# Patient Record
Sex: MALE | Race: WHITE | NOT HISPANIC OR LATINO | Employment: UNEMPLOYED | URBAN - METROPOLITAN AREA
[De-identification: names, ages, dates, MRNs, and addresses within clinical notes are randomized per-mention and may not be internally consistent; named-entity substitution may affect disease eponyms.]

---

## 2021-03-01 ENCOUNTER — OFFICE VISIT (OUTPATIENT)
Dept: PRIMARY CARE CLINIC | Facility: CLINIC | Age: 66
End: 2021-03-01
Payer: MEDICARE

## 2021-03-01 ENCOUNTER — LAB VISIT (OUTPATIENT)
Dept: LAB | Facility: HOSPITAL | Age: 66
End: 2021-03-01
Attending: INTERNAL MEDICINE
Payer: MEDICARE

## 2021-03-01 VITALS
TEMPERATURE: 98 F | HEIGHT: 70 IN | DIASTOLIC BLOOD PRESSURE: 70 MMHG | HEART RATE: 63 BPM | WEIGHT: 201.75 LBS | OXYGEN SATURATION: 96 % | BODY MASS INDEX: 28.88 KG/M2 | SYSTOLIC BLOOD PRESSURE: 100 MMHG

## 2021-03-01 DIAGNOSIS — I25.10 CORONARY ARTERY DISEASE INVOLVING NATIVE CORONARY ARTERY OF NATIVE HEART WITHOUT ANGINA PECTORIS: ICD-10-CM

## 2021-03-01 DIAGNOSIS — I25.10 CORONARY ARTERY DISEASE INVOLVING NATIVE CORONARY ARTERY OF NATIVE HEART WITHOUT ANGINA PECTORIS: Primary | ICD-10-CM

## 2021-03-01 DIAGNOSIS — E78.2 HYPERLIPIDEMIA, MIXED: ICD-10-CM

## 2021-03-01 DIAGNOSIS — Z23 NEED FOR VACCINATION: ICD-10-CM

## 2021-03-01 DIAGNOSIS — Z12.11 SCREEN FOR COLON CANCER: ICD-10-CM

## 2021-03-01 PROCEDURE — 99203 OFFICE O/P NEW LOW 30 MIN: CPT | Mod: PBBFAC,PN | Performed by: INTERNAL MEDICINE

## 2021-03-01 PROCEDURE — 80061 LIPID PANEL: CPT

## 2021-03-01 PROCEDURE — 99204 OFFICE O/P NEW MOD 45 MIN: CPT | Mod: S$GLB,,, | Performed by: INTERNAL MEDICINE

## 2021-03-01 PROCEDURE — 99999 PR PBB SHADOW E&M-NEW PATIENT-LVL III: CPT | Mod: PBBFAC,,, | Performed by: INTERNAL MEDICINE

## 2021-03-01 PROCEDURE — 99204 PR OFFICE/OUTPT VISIT, NEW, LEVL IV, 45-59 MIN: ICD-10-PCS | Mod: S$GLB,,, | Performed by: INTERNAL MEDICINE

## 2021-03-01 PROCEDURE — 36415 COLL VENOUS BLD VENIPUNCTURE: CPT | Mod: PN

## 2021-03-01 PROCEDURE — 80053 COMPREHEN METABOLIC PANEL: CPT

## 2021-03-01 PROCEDURE — 99999 PR PBB SHADOW E&M-NEW PATIENT-LVL III: ICD-10-PCS | Mod: PBBFAC,,, | Performed by: INTERNAL MEDICINE

## 2021-03-01 RX ORDER — ASPIRIN 81 MG/1
81 TABLET ORAL
COMMUNITY
Start: 2020-06-06

## 2021-03-01 RX ORDER — AA/PROT/LYSINE/METHIO/VIT C/B6 50-12.5 MG
10 TABLET ORAL DAILY
COMMUNITY

## 2021-03-01 RX ORDER — METOPROLOL SUCCINATE 25 MG/1
25 TABLET, EXTENDED RELEASE ORAL DAILY
COMMUNITY
Start: 2020-08-03

## 2021-03-01 RX ORDER — ATORVASTATIN CALCIUM 80 MG/1
80 TABLET, FILM COATED ORAL DAILY
Qty: 90 TABLET | Refills: 3 | Status: SHIPPED | OUTPATIENT
Start: 2021-03-01 | End: 2021-06-18 | Stop reason: SDUPTHER

## 2021-03-01 RX ORDER — VIT C/E/ZN/COPPR/LUTEIN/ZEAXAN 250MG-90MG
1000 CAPSULE ORAL
COMMUNITY

## 2021-03-01 RX ORDER — NITROGLYCERIN 0.4 MG/1
0.4 TABLET SUBLINGUAL DAILY PRN
COMMUNITY
Start: 2020-06-08 | End: 2021-06-11 | Stop reason: SDUPTHER

## 2021-03-01 RX ORDER — ATORVASTATIN CALCIUM 80 MG/1
80 TABLET, FILM COATED ORAL
COMMUNITY
Start: 2020-06-08 | End: 2021-03-01 | Stop reason: SDUPTHER

## 2021-03-02 LAB
ALBUMIN SERPL BCP-MCNC: 4.2 G/DL (ref 3.5–5.2)
ALP SERPL-CCNC: 71 U/L (ref 55–135)
ALT SERPL W/O P-5'-P-CCNC: 33 U/L (ref 10–44)
ANION GAP SERPL CALC-SCNC: 9 MMOL/L (ref 8–16)
AST SERPL-CCNC: 26 U/L (ref 10–40)
BILIRUB SERPL-MCNC: 0.8 MG/DL (ref 0.1–1)
BUN SERPL-MCNC: 19 MG/DL (ref 8–23)
CALCIUM SERPL-MCNC: 9.1 MG/DL (ref 8.7–10.5)
CHLORIDE SERPL-SCNC: 106 MMOL/L (ref 95–110)
CHOLEST SERPL-MCNC: 139 MG/DL (ref 120–199)
CHOLEST/HDLC SERPL: 3.2 {RATIO} (ref 2–5)
CO2 SERPL-SCNC: 25 MMOL/L (ref 23–29)
CREAT SERPL-MCNC: 1 MG/DL (ref 0.5–1.4)
EST. GFR  (AFRICAN AMERICAN): >60 ML/MIN/1.73 M^2
EST. GFR  (NON AFRICAN AMERICAN): >60 ML/MIN/1.73 M^2
GLUCOSE SERPL-MCNC: 75 MG/DL (ref 70–110)
HDLC SERPL-MCNC: 44 MG/DL (ref 40–75)
HDLC SERPL: 31.7 % (ref 20–50)
LDLC SERPL CALC-MCNC: 81.6 MG/DL (ref 63–159)
NONHDLC SERPL-MCNC: 95 MG/DL
POTASSIUM SERPL-SCNC: 4.5 MMOL/L (ref 3.5–5.1)
PROT SERPL-MCNC: 7.2 G/DL (ref 6–8.4)
SODIUM SERPL-SCNC: 140 MMOL/L (ref 136–145)
TRIGL SERPL-MCNC: 67 MG/DL (ref 30–150)

## 2021-03-09 ENCOUNTER — TELEPHONE (OUTPATIENT)
Dept: FAMILY MEDICINE | Facility: CLINIC | Age: 66
End: 2021-03-09

## 2021-03-12 ENCOUNTER — PATIENT MESSAGE (OUTPATIENT)
Dept: FAMILY MEDICINE | Facility: CLINIC | Age: 66
End: 2021-03-12

## 2021-06-10 ENCOUNTER — PATIENT MESSAGE (OUTPATIENT)
Dept: PRIMARY CARE CLINIC | Facility: CLINIC | Age: 66
End: 2021-06-10

## 2021-06-10 ENCOUNTER — OFFICE VISIT (OUTPATIENT)
Dept: URGENT CARE | Facility: CLINIC | Age: 66
End: 2021-06-10

## 2021-06-10 VITALS
OXYGEN SATURATION: 98 % | HEIGHT: 69 IN | DIASTOLIC BLOOD PRESSURE: 82 MMHG | BODY MASS INDEX: 29.33 KG/M2 | TEMPERATURE: 98 F | WEIGHT: 198 LBS | SYSTOLIC BLOOD PRESSURE: 115 MMHG | RESPIRATION RATE: 16 BRPM | HEART RATE: 76 BPM

## 2021-06-10 DIAGNOSIS — I25.10 CORONARY ARTERY DISEASE INVOLVING NATIVE CORONARY ARTERY OF NATIVE HEART WITHOUT ANGINA PECTORIS: Primary | ICD-10-CM

## 2021-06-11 ENCOUNTER — OFFICE VISIT (OUTPATIENT)
Dept: CARDIOLOGY | Facility: CLINIC | Age: 66
End: 2021-06-11
Payer: MEDICARE

## 2021-06-11 VITALS
HEIGHT: 70 IN | HEART RATE: 64 BPM | SYSTOLIC BLOOD PRESSURE: 114 MMHG | BODY MASS INDEX: 28.56 KG/M2 | WEIGHT: 199.5 LBS | DIASTOLIC BLOOD PRESSURE: 62 MMHG

## 2021-06-11 DIAGNOSIS — I25.10 CORONARY ARTERY DISEASE INVOLVING NATIVE CORONARY ARTERY OF NATIVE HEART WITHOUT ANGINA PECTORIS: Primary | ICD-10-CM

## 2021-06-11 DIAGNOSIS — E78.00 HYPERCHOLESTEREMIA: ICD-10-CM

## 2021-06-11 DIAGNOSIS — I49.3 PVCS (PREMATURE VENTRICULAR CONTRACTIONS): ICD-10-CM

## 2021-06-11 DIAGNOSIS — I25.2 OLD MI (MYOCARDIAL INFARCTION): ICD-10-CM

## 2021-06-11 DIAGNOSIS — Z95.5 S/P CORONARY ARTERY STENT PLACEMENT: ICD-10-CM

## 2021-06-11 PROCEDURE — 93010 ELECTROCARDIOGRAM REPORT: CPT | Mod: S$GLB,,, | Performed by: INTERNAL MEDICINE

## 2021-06-11 PROCEDURE — 99204 PR OFFICE/OUTPT VISIT, NEW, LEVL IV, 45-59 MIN: ICD-10-PCS | Mod: S$GLB,,, | Performed by: INTERNAL MEDICINE

## 2021-06-11 PROCEDURE — 99499 RISK ADDL DX/OHS AUDIT: ICD-10-PCS | Mod: S$GLB,,, | Performed by: INTERNAL MEDICINE

## 2021-06-11 PROCEDURE — 99499 UNLISTED E&M SERVICE: CPT | Mod: S$GLB,,, | Performed by: INTERNAL MEDICINE

## 2021-06-11 PROCEDURE — 99204 OFFICE O/P NEW MOD 45 MIN: CPT | Mod: S$GLB,,, | Performed by: INTERNAL MEDICINE

## 2021-06-11 PROCEDURE — 99999 PR PBB SHADOW E&M-EST. PATIENT-LVL IV: ICD-10-PCS | Mod: PBBFAC,,, | Performed by: INTERNAL MEDICINE

## 2021-06-11 PROCEDURE — 3008F BODY MASS INDEX DOCD: CPT | Mod: CPTII,S$GLB,, | Performed by: INTERNAL MEDICINE

## 2021-06-11 PROCEDURE — 93005 ELECTROCARDIOGRAM TRACING: CPT | Mod: S$GLB,,, | Performed by: INTERNAL MEDICINE

## 2021-06-11 PROCEDURE — 1126F AMNT PAIN NOTED NONE PRSNT: CPT | Mod: S$GLB,,, | Performed by: INTERNAL MEDICINE

## 2021-06-11 PROCEDURE — 3288F PR FALLS RISK ASSESSMENT DOCUMENTED: ICD-10-PCS | Mod: CPTII,S$GLB,, | Performed by: INTERNAL MEDICINE

## 2021-06-11 PROCEDURE — 1126F PR PAIN SEVERITY QUANTIFIED, NO PAIN PRESENT: ICD-10-PCS | Mod: S$GLB,,, | Performed by: INTERNAL MEDICINE

## 2021-06-11 PROCEDURE — 1101F PR PT FALLS ASSESS DOC 0-1 FALLS W/OUT INJ PAST YR: ICD-10-PCS | Mod: CPTII,S$GLB,, | Performed by: INTERNAL MEDICINE

## 2021-06-11 PROCEDURE — 93010 EKG 12-LEAD: ICD-10-PCS | Mod: S$GLB,,, | Performed by: INTERNAL MEDICINE

## 2021-06-11 PROCEDURE — 99999 PR PBB SHADOW E&M-EST. PATIENT-LVL IV: CPT | Mod: PBBFAC,,, | Performed by: INTERNAL MEDICINE

## 2021-06-11 PROCEDURE — 3008F PR BODY MASS INDEX (BMI) DOCUMENTED: ICD-10-PCS | Mod: CPTII,S$GLB,, | Performed by: INTERNAL MEDICINE

## 2021-06-11 PROCEDURE — 93005 EKG 12-LEAD: ICD-10-PCS | Mod: S$GLB,,, | Performed by: INTERNAL MEDICINE

## 2021-06-11 PROCEDURE — 1101F PT FALLS ASSESS-DOCD LE1/YR: CPT | Mod: CPTII,S$GLB,, | Performed by: INTERNAL MEDICINE

## 2021-06-11 PROCEDURE — 3288F FALL RISK ASSESSMENT DOCD: CPT | Mod: CPTII,S$GLB,, | Performed by: INTERNAL MEDICINE

## 2021-06-11 RX ORDER — NITROGLYCERIN 0.4 MG/1
0.4 TABLET SUBLINGUAL DAILY PRN
Qty: 100 TABLET | Refills: 3 | Status: SHIPPED | OUTPATIENT
Start: 2021-06-11 | End: 2022-06-14

## 2021-06-11 RX ORDER — EZETIMIBE 10 MG/1
10 TABLET ORAL DAILY
Qty: 90 TABLET | Refills: 3 | Status: SHIPPED | OUTPATIENT
Start: 2021-06-11 | End: 2021-07-11

## 2021-06-15 ENCOUNTER — HOSPITAL ENCOUNTER (OUTPATIENT)
Dept: CARDIOLOGY | Facility: HOSPITAL | Age: 66
Discharge: HOME | End: 2021-06-15
Attending: INTERNAL MEDICINE
Payer: MEDICARE

## 2021-06-15 VITALS — WEIGHT: 198 LBS | BODY MASS INDEX: 28.35 KG/M2 | HEIGHT: 70 IN

## 2021-06-15 DIAGNOSIS — I25.2 OLD MI (MYOCARDIAL INFARCTION): ICD-10-CM

## 2021-06-15 DIAGNOSIS — Z95.5 S/P CORONARY ARTERY STENT PLACEMENT: ICD-10-CM

## 2021-06-15 DIAGNOSIS — I25.10 CORONARY ARTERY DISEASE INVOLVING NATIVE CORONARY ARTERY OF NATIVE HEART WITHOUT ANGINA PECTORIS: ICD-10-CM

## 2021-06-15 LAB
ASCENDING AORTA: 3.45 CM
BSA FOR ECHO PROCEDURE: 2.11 M2
CV ECHO LV RWT: 0.34 CM
CV STRESS BASE HR: 67 BPM
DIASTOLIC BLOOD PRESSURE: 98 MMHG
DOP CALC LVOT AREA: 3.6 CM2
DOP CALC LVOT DIAMETER: 2.15 CM
DOP CALC LVOT PEAK VEL: 0.94 M/S
DOP CALC LVOT STROKE VOLUME: 70.43 CM3
DOP CALCLVOT PEAK VEL VTI: 19.41 CM
E WAVE DECELERATION TIME: 276.37 MSEC
E/A RATIO: 0.88
E/E' RATIO: 11 M/S
ECHO LV POSTERIOR WALL: 0.83 CM (ref 0.6–1.1)
EJECTION FRACTION: 55 %
FRACTIONAL SHORTENING: 29 % (ref 28–44)
INTERVENTRICULAR SEPTUM: 0.8 CM (ref 0.6–1.1)
IVRT: 97.05 MSEC
LA MAJOR: 5.29 CM
LA MINOR: 4.88 CM
LA WIDTH: 2.37 CM
LEFT ATRIUM SIZE: 3.02 CM
LEFT ATRIUM VOLUME INDEX: 14.8 ML/M2
LEFT ATRIUM VOLUME: 30.89 CM3
LEFT INTERNAL DIMENSION IN SYSTOLE: 3.46 CM (ref 2.1–4)
LEFT VENTRICLE DIASTOLIC VOLUME INDEX: 54.13 ML/M2
LEFT VENTRICLE DIASTOLIC VOLUME: 112.6 ML
LEFT VENTRICLE MASS INDEX: 65 G/M2
LEFT VENTRICLE SYSTOLIC VOLUME INDEX: 23.8 ML/M2
LEFT VENTRICLE SYSTOLIC VOLUME: 49.49 ML
LEFT VENTRICULAR INTERNAL DIMENSION IN DIASTOLE: 4.9 CM (ref 3.5–6)
LEFT VENTRICULAR MASS: 134.38 G
LV LATERAL E/E' RATIO: 9.63 M/S
LV SEPTAL E/E' RATIO: 12.83 M/S
MV A" WAVE DURATION": 9.71 MSEC
MV PEAK A VEL: 0.88 M/S
MV PEAK E VEL: 0.77 M/S
MV STENOSIS PRESSURE HALF TIME: 80.15 MS
MV VALVE AREA P 1/2 METHOD: 2.74 CM2
OHS CV CPX 1 MINUTE RECOVERY HEART RATE: 113 BPM
OHS CV CPX 85 PERCENT MAX PREDICTED HEART RATE MALE: 132
OHS CV CPX ESTIMATED METS: 10
OHS CV CPX MAX PREDICTED HEART RATE: 155
OHS CV CPX PATIENT IS FEMALE: 0
OHS CV CPX PATIENT IS MALE: 1
OHS CV CPX PEAK DIASTOLIC BLOOD PRESSURE: 72 MMHG
OHS CV CPX PEAK HEAR RATE: 144 BPM
OHS CV CPX PEAK RATE PRESSURE PRODUCT: NORMAL
OHS CV CPX PEAK SYSTOLIC BLOOD PRESSURE: 207 MMHG
OHS CV CPX PERCENT MAX PREDICTED HEART RATE ACHIEVED: 93
OHS CV CPX RATE PRESSURE PRODUCT PRESENTING: 9112
PULM VEIN S/D RATIO: 1.25
PV PEAK D VEL: 0.4 M/S
PV PEAK S VEL: 0.5 M/S
RA MAJOR: 4.55 CM
RA PRESSURE: 3 MMHG
RA WIDTH: 2.33 CM
RIGHT VENTRICULAR END-DIASTOLIC DIMENSION: 3.6 CM
RV TISSUE DOPPLER FREE WALL SYSTOLIC VELOCITY 1 (APICAL 4 CHAMBER VIEW): 9.22 CM/S
SINUS: 3.86 CM
STJ: 3.34 CM
STRESS ECHO POST EXERCISE DUR MIN: 7 MINUTES
STRESS ECHO POST EXERCISE DUR SEC: 16 SECONDS
SYSTOLIC BLOOD PRESSURE: 136 MMHG
TDI LATERAL: 0.08 M/S
TDI SEPTAL: 0.06 M/S
TDI: 0.07 M/S
TRICUSPID ANNULAR PLANE SYSTOLIC EXCURSION: 2.29 CM

## 2021-06-15 PROCEDURE — 93351 STRESS TTE COMPLETE: CPT

## 2021-06-15 PROCEDURE — 93351 STRESS ECHO (CUPID ONLY): ICD-10-PCS | Mod: 26,,, | Performed by: INTERNAL MEDICINE

## 2021-06-15 PROCEDURE — 93351 STRESS TTE COMPLETE: CPT | Mod: 26,,, | Performed by: INTERNAL MEDICINE

## 2021-06-17 ENCOUNTER — CLINICAL SUPPORT (OUTPATIENT)
Dept: URGENT CARE | Facility: CLINIC | Age: 66
End: 2021-06-17

## 2021-06-17 DIAGNOSIS — Z02.89 ENCOUNTER FOR EXAMINATION REQUIRED BY DEPARTMENT OF TRANSPORTATION (DOT): Primary | ICD-10-CM

## 2021-06-17 PROCEDURE — 99499 UNLISTED E&M SERVICE: CPT | Mod: S$GLB,,, | Performed by: NURSE PRACTITIONER

## 2021-06-17 PROCEDURE — 99499 PHYSICAL, RECERT DOT/CDL: ICD-10-PCS | Mod: S$GLB,,, | Performed by: NURSE PRACTITIONER

## 2021-06-18 DIAGNOSIS — E78.2 HYPERLIPIDEMIA, MIXED: ICD-10-CM

## 2021-06-18 DIAGNOSIS — I25.10 CORONARY ARTERY DISEASE INVOLVING NATIVE CORONARY ARTERY OF NATIVE HEART WITHOUT ANGINA PECTORIS: ICD-10-CM

## 2021-06-18 RX ORDER — ATORVASTATIN CALCIUM 80 MG/1
80 TABLET, FILM COATED ORAL DAILY
Qty: 90 TABLET | Refills: 3 | Status: SHIPPED | OUTPATIENT
Start: 2021-06-18

## 2021-06-18 NOTE — TELEPHONE ENCOUNTER
No new care gaps identified.  Powered by [employer]. Reference number: [id number].   6/18/2021 3:02:24 PM CDT

## 2021-08-08 ENCOUNTER — CLINICAL SUPPORT (OUTPATIENT)
Dept: URGENT CARE | Facility: CLINIC | Age: 66
End: 2021-08-08
Payer: MEDICARE

## 2021-08-08 DIAGNOSIS — Z20.822 COVID-19 RULED OUT: Primary | ICD-10-CM

## 2021-08-08 LAB
CTP QC/QA: YES
SARS-COV-2 RDRP RESP QL NAA+PROBE: NEGATIVE

## 2021-12-13 ENCOUNTER — IMMUNIZATION (OUTPATIENT)
Dept: PHARMACY | Facility: CLINIC | Age: 66
End: 2021-12-13
Payer: MEDICARE

## 2021-12-13 DIAGNOSIS — Z23 NEED FOR VACCINATION: Primary | ICD-10-CM

## 2022-02-09 ENCOUNTER — HOSPITAL ENCOUNTER (OUTPATIENT)
Dept: RADIOLOGY | Facility: HOSPITAL | Age: 67
Discharge: HOME | End: 2022-02-09
Attending: INTERNAL MEDICINE
Payer: MEDICARE

## 2022-02-09 ENCOUNTER — OFFICE VISIT (OUTPATIENT)
Dept: INTERNAL MEDICINE | Facility: CLINIC | Age: 67
End: 2022-02-09
Payer: MEDICARE

## 2022-02-09 VITALS
BODY MASS INDEX: 28.2 KG/M2 | SYSTOLIC BLOOD PRESSURE: 120 MMHG | OXYGEN SATURATION: 98 % | WEIGHT: 197 LBS | HEART RATE: 84 BPM | HEIGHT: 70 IN | DIASTOLIC BLOOD PRESSURE: 72 MMHG

## 2022-02-09 DIAGNOSIS — M54.16 ACUTE RIGHT LUMBAR RADICULOPATHY: ICD-10-CM

## 2022-02-09 DIAGNOSIS — M54.16 ACUTE RIGHT LUMBAR RADICULOPATHY: Primary | ICD-10-CM

## 2022-02-09 PROCEDURE — 1159F MED LIST DOCD IN RCRD: CPT | Mod: HCNC,CPTII,S$GLB, | Performed by: INTERNAL MEDICINE

## 2022-02-09 PROCEDURE — 1125F AMNT PAIN NOTED PAIN PRSNT: CPT | Mod: HCNC,CPTII,S$GLB, | Performed by: INTERNAL MEDICINE

## 2022-02-09 PROCEDURE — 3008F PR BODY MASS INDEX (BMI) DOCUMENTED: ICD-10-PCS | Mod: HCNC,CPTII,S$GLB, | Performed by: INTERNAL MEDICINE

## 2022-02-09 PROCEDURE — 1160F PR REVIEW ALL MEDS BY PRESCRIBER/CLIN PHARMACIST DOCUMENTED: ICD-10-PCS | Mod: HCNC,CPTII,S$GLB, | Performed by: INTERNAL MEDICINE

## 2022-02-09 PROCEDURE — 99999 PR PBB SHADOW E&M-EST. PATIENT-LVL III: ICD-10-PCS | Mod: PBBFAC,HCNC,, | Performed by: INTERNAL MEDICINE

## 2022-02-09 PROCEDURE — 3288F FALL RISK ASSESSMENT DOCD: CPT | Mod: HCNC,CPTII,S$GLB, | Performed by: INTERNAL MEDICINE

## 2022-02-09 PROCEDURE — 3288F PR FALLS RISK ASSESSMENT DOCUMENTED: ICD-10-PCS | Mod: HCNC,CPTII,S$GLB, | Performed by: INTERNAL MEDICINE

## 2022-02-09 PROCEDURE — 3078F PR MOST RECENT DIASTOLIC BLOOD PRESSURE < 80 MM HG: ICD-10-PCS | Mod: HCNC,CPTII,S$GLB, | Performed by: INTERNAL MEDICINE

## 2022-02-09 PROCEDURE — 3078F DIAST BP <80 MM HG: CPT | Mod: HCNC,CPTII,S$GLB, | Performed by: INTERNAL MEDICINE

## 2022-02-09 PROCEDURE — 99214 OFFICE O/P EST MOD 30 MIN: CPT | Mod: HCNC,S$GLB,, | Performed by: INTERNAL MEDICINE

## 2022-02-09 PROCEDURE — 1101F PT FALLS ASSESS-DOCD LE1/YR: CPT | Mod: HCNC,CPTII,S$GLB, | Performed by: INTERNAL MEDICINE

## 2022-02-09 PROCEDURE — 72100 X-RAY EXAM L-S SPINE 2/3 VWS: CPT | Mod: TC,HCNC

## 2022-02-09 PROCEDURE — 99214 PR OFFICE/OUTPT VISIT, EST, LEVL IV, 30-39 MIN: ICD-10-PCS | Mod: HCNC,S$GLB,, | Performed by: INTERNAL MEDICINE

## 2022-02-09 PROCEDURE — 3008F BODY MASS INDEX DOCD: CPT | Mod: HCNC,CPTII,S$GLB, | Performed by: INTERNAL MEDICINE

## 2022-02-09 PROCEDURE — 1159F PR MEDICATION LIST DOCUMENTED IN MEDICAL RECORD: ICD-10-PCS | Mod: HCNC,CPTII,S$GLB, | Performed by: INTERNAL MEDICINE

## 2022-02-09 PROCEDURE — 1101F PR PT FALLS ASSESS DOC 0-1 FALLS W/OUT INJ PAST YR: ICD-10-PCS | Mod: HCNC,CPTII,S$GLB, | Performed by: INTERNAL MEDICINE

## 2022-02-09 PROCEDURE — 3074F SYST BP LT 130 MM HG: CPT | Mod: HCNC,CPTII,S$GLB, | Performed by: INTERNAL MEDICINE

## 2022-02-09 PROCEDURE — 3074F PR MOST RECENT SYSTOLIC BLOOD PRESSURE < 130 MM HG: ICD-10-PCS | Mod: HCNC,CPTII,S$GLB, | Performed by: INTERNAL MEDICINE

## 2022-02-09 PROCEDURE — 99999 PR PBB SHADOW E&M-EST. PATIENT-LVL III: CPT | Mod: PBBFAC,HCNC,, | Performed by: INTERNAL MEDICINE

## 2022-02-09 PROCEDURE — 72100 X-RAY EXAM L-S SPINE 2/3 VWS: CPT | Mod: 26,HCNC,, | Performed by: RADIOLOGY

## 2022-02-09 PROCEDURE — 72100 XR LUMBAR SPINE AP AND LATERAL: ICD-10-PCS | Mod: 26,HCNC,, | Performed by: RADIOLOGY

## 2022-02-09 PROCEDURE — 1160F RVW MEDS BY RX/DR IN RCRD: CPT | Mod: HCNC,CPTII,S$GLB, | Performed by: INTERNAL MEDICINE

## 2022-02-09 PROCEDURE — 1125F PR PAIN SEVERITY QUANTIFIED, PAIN PRESENT: ICD-10-PCS | Mod: HCNC,CPTII,S$GLB, | Performed by: INTERNAL MEDICINE

## 2022-02-09 RX ORDER — DICLOFENAC SODIUM 75 MG/1
75 TABLET, DELAYED RELEASE ORAL 2 TIMES DAILY
Qty: 20 TABLET | Refills: 0 | Status: SHIPPED | OUTPATIENT
Start: 2022-02-09 | End: 2022-02-16 | Stop reason: SDUPTHER

## 2022-02-09 RX ORDER — PREDNISONE 20 MG/1
TABLET ORAL
Qty: 12 TABLET | Refills: 0 | Status: SHIPPED | OUTPATIENT
Start: 2022-02-09 | End: 2022-02-16 | Stop reason: SDUPTHER

## 2022-02-09 NOTE — PROGRESS NOTES
66-year-old male  The past 4 days has been having pain it is right buttocks that extends down the lateral buttocks down the lateral aspect of the upper leg to the knee.  It is worse with standing up and walking.  Reports no numbness or tingling involving the leg.  He does not recall any incident activity account for this.  Reports no abdominal pain and no difficulty with bowel urine function  He reports having about of sciatica many years ago that resolved on his own    Medical history  Coronary artery disease, bypass surgery, prior MI  Hyperlipidemia    Medication list per med card    Examination  Weight 197 lb  Pulse 76  Blood pressure 118/72  Not tender over the back  Abdominal exam soft nontender  Positive straight leg raise right of right lower extremity    2+ left knee reflex, absent right knee reflex  1+ left ankle reflex, 2+ right ankle reflex    Impression  Acute right lumbar radiculopathy possibly L4 nerve root    Plan lumbar spine film  Prednisone taper over next 8 days and diclofenac 75 mg b.i.d. for the next 10 days  There is no improvement or worsening, MRI the lumbar spine

## 2022-02-11 ENCOUNTER — NURSE TRIAGE (OUTPATIENT)
Dept: ADMINISTRATIVE | Facility: CLINIC | Age: 67
End: 2022-02-11
Payer: MEDICARE

## 2022-02-12 NOTE — TELEPHONE ENCOUNTER
Pt reports Rt sided lower back pain that is extending down his leg to his knee, was seen by a MD on 2/9/22 and given some prescriptions, but the pain tonight has worsened to severe and he is unable to walk. Pt advised to go to ED now per protocol, Pt encouraged to call back with any worsening symptoms or questions and verbalized understanding.    Reason for Disposition   Weakness of a leg or foot (e.g., unable to bear weight, dragging foot)    Additional Information   Negative: Passed out (i.e., lost consciousness, collapsed and was not responding)   Negative: Shock suspected (e.g., cold/pale/clammy skin, too weak to stand, low BP, rapid pulse)   Negative: Sounds like a life-threatening emergency to the triager   Negative: [1] SEVERE back pain (e.g., excruciating) AND [2] sudden onset AND [3] age > 60 years   Negative: [1] Unable to urinate (or only a few drops) > 4 hours AND [2] bladder feels very full (e.g., palpable bladder or strong urge to urinate)   Negative: [1] Loss of bladder or bowel control (urine or bowel incontinence; wetting self, leaking stool) AND [2] new-onset   Negative: Numbness in groin or rectal area (i.e., loss of sensation)   Negative: [1] SEVERE abdominal pain AND [2] present > 1 hour   Negative: [1] Abdominal pain AND [2] age > 60 years    Protocols used: BACK PAIN-A-

## 2022-02-16 ENCOUNTER — PATIENT MESSAGE (OUTPATIENT)
Dept: INTERNAL MEDICINE | Facility: CLINIC | Age: 67
End: 2022-02-16
Payer: MEDICARE

## 2022-02-16 DIAGNOSIS — M54.16 ACUTE RIGHT LUMBAR RADICULOPATHY: ICD-10-CM

## 2022-02-16 RX ORDER — DICLOFENAC SODIUM 75 MG/1
75 TABLET, DELAYED RELEASE ORAL 2 TIMES DAILY
Qty: 20 TABLET | Refills: 0 | Status: SHIPPED | OUTPATIENT
Start: 2022-02-16 | End: 2022-02-17

## 2022-02-16 RX ORDER — PREDNISONE 20 MG/1
TABLET ORAL
Qty: 12 TABLET | Refills: 0 | Status: SHIPPED | OUTPATIENT
Start: 2022-02-16 | End: 2022-02-17

## 2022-02-17 ENCOUNTER — PATIENT MESSAGE (OUTPATIENT)
Dept: INTERNAL MEDICINE | Facility: CLINIC | Age: 67
End: 2022-02-17
Payer: MEDICARE

## 2022-02-17 DIAGNOSIS — M54.16 ACUTE RIGHT LUMBAR RADICULOPATHY: ICD-10-CM

## 2022-02-17 RX ORDER — DICLOFENAC SODIUM 75 MG/1
75 TABLET, DELAYED RELEASE ORAL 2 TIMES DAILY
Qty: 20 TABLET | Refills: 0 | Status: SHIPPED | OUTPATIENT
Start: 2022-02-17 | End: 2022-02-27

## 2022-02-17 RX ORDER — PREDNISONE 20 MG/1
TABLET ORAL
Qty: 12 TABLET | Refills: 0 | Status: SHIPPED | OUTPATIENT
Start: 2022-02-17 | End: 2022-03-11

## 2022-03-11 ENCOUNTER — TELEPHONE (OUTPATIENT)
Dept: INTERNAL MEDICINE | Facility: CLINIC | Age: 67
End: 2022-03-11
Payer: MEDICARE

## 2022-03-11 DIAGNOSIS — M54.16 LUMBAR RADICULOPATHY, CHRONIC: ICD-10-CM

## 2022-03-11 DIAGNOSIS — M54.16 ACUTE RIGHT LUMBAR RADICULOPATHY: Primary | ICD-10-CM

## 2022-03-11 NOTE — TELEPHONE ENCOUNTER
Ordered MRI to further evaluate.  Cannot re-dose oral steroids this close in proximity to last course.  Referring to pain management for evaluation of epidural steroid injections

## 2022-03-11 NOTE — TELEPHONE ENCOUNTER
----- Message from Pro Nye sent at 3/11/2022  2:31 PM CST -----  Contact: [facility]  Desire [phone]  Requesting an RX refill or new RX.  Is this a refill or new RX: Refill  RX name and strength predniSONE (DELTASONE) 20 MG tablet and diclofenac (VOLTAREN) 75 MG EC tablet   Pharmacy name and phone # [facility] - [facility] [address] Phone:  [phone] Fax:  [phone]    Comments: see Dr. Saunders's notes on 2/17/22 concerning these refills.

## 2022-03-11 NOTE — TELEPHONE ENCOUNTER
Pt hasn't been seen since 3/1/21. He seen Dr. Saunders on 2/9/22 for back pain. Please see [employer]: online travellers [employer] message on 2/17 from Dr. Saunders

## 2022-08-10 ENCOUNTER — IMMUNIZATION (OUTPATIENT)
Dept: VACCINE CLINIC | Facility: HOSPITAL | Age: 67
End: 2022-08-10

## 2022-08-10 DIAGNOSIS — Z23 NEED FOR VACCINATION: Primary | ICD-10-CM

## 2022-08-10 PROCEDURE — 91305 HC SARSCOV2 VAC 30 MCG TRS-SUCR PFIZER: CPT | Performed by: INTERNAL MEDICINE

## 2022-08-10 PROCEDURE — 0054A HC ADM SARSCV2 30MCG TRS-SUCR BOOSTER: CPT | Performed by: INTERNAL MEDICINE

## 2022-09-11 ENCOUNTER — TELEMEDICINE (OUTPATIENT)
Dept: FAMILY MEDICINE CLINIC | Facility: TELEHEALTH | Age: 67
End: 2022-09-11

## 2022-09-11 DIAGNOSIS — H10.31 ACUTE BACTERIAL CONJUNCTIVITIS OF RIGHT EYE: Primary | ICD-10-CM

## 2022-09-11 PROCEDURE — 99213 OFFICE O/P EST LOW 20 MIN: CPT | Performed by: NURSE PRACTITIONER

## 2022-09-11 RX ORDER — ASPIRIN 81 MG/1
81 TABLET ORAL
COMMUNITY

## 2022-09-11 RX ORDER — TOBRAMYCIN 3 MG/ML
1 SOLUTION/ DROPS OPHTHALMIC EVERY 6 HOURS SCHEDULED
Qty: 2 ML | Refills: 0 | Status: SHIPPED | OUTPATIENT
Start: 2022-09-11 | End: 2022-09-18

## 2022-09-11 NOTE — PROGRESS NOTES
You have chosen to receive care through a telehealth visit.  Do you consent to use a video/audio connection for your medical care today? Yes     CHIEF COMPLAINT  No chief complaint on file.        HPI  Leobardo Cunningham is a 67 y.o. male  presents with complaint of has cold symptoms, this morning right eye is red, matted together this morning with crusting.      Grandson has pink eye    Review of Systems   Eyes: Positive for discharge and redness.   All other systems reviewed and are negative.      No past medical history on file.    No family history on file.    Social History     Socioeconomic History   • Marital status:    Tobacco Use   • Smoking status: Never Smoker   • Smokeless tobacco: Never Used       Leobardo Cunningham  reports that he has never smoked. He has never used smokeless tobacco..              There were no vitals taken for this visit.    PHYSICAL EXAM  Physical Exam   Constitutional: He is oriented to person, place, and time. He appears well-developed and well-nourished. He does not have a sickly appearance. He does not appear ill.   HENT:   Head: Normocephalic and atraumatic.   Eyes: Right eye exhibits discharge. Right eye exhibits no hordeolum and no edema. Right conjunctiva is injected.   Pulmonary/Chest: Effort normal.  No respiratory distress.  Neurological: He is alert and oriented to person, place, and time.       No results found for this or any previous visit.    Diagnoses and all orders for this visit:    1. Acute bacterial conjunctivitis of right eye (Primary)  -     tobramycin (Tobrex) 0.3 % solution ophthalmic solution; Administer 1 drop to the right eye Every 6 (Six) Hours for 7 days.  Dispense: 2 mL; Refill: 0    --use eye drops as prescribed  --f/u in 3-5 days if no improvement      FOLLOW-UP  As discussed during visit with PCP/[facility] if no improvement or Urgent Care/Emergency Department if worsening of symptoms    Patient verbalizes understanding of medication  dosage, comfort measures, instructions for treatment and follow-up.    Elo Stokes, YARELY  09/11/2022  12:01 EDT    The use of a video visit has been reviewed with the patient and verbal informed consent has been obtained. Myself and Leobardo Cunningham participated in this visit. The patient is located in [address] [place] [address].    I am located in [place], [place]. Mychart and Zoom were utilized. I spent 20 minutes in the patient's chart for this visit.

## 2022-09-11 NOTE — PATIENT INSTRUCTIONS
Bacterial Conjunctivitis, Adult  Bacterial conjunctivitis is an infection of the clear membrane that covers the white part of your eye and the inner surface of your eyelid (conjunctiva). When the blood vessels in your conjunctiva become inflamed, your eye becomes red or pink, and it will probably feel itchy. Bacterial conjunctivitis spreads very easily from person to person (is contagious). It also spreads easily from one eye to the other eye.  What are the causes?  This condition is caused by bacteria. You may get the infection if you come into close contact with:  A person who is infected with the bacteria.  Items that are contaminated with the bacteria, such as a face towel, contact lens solution, or eye makeup.  What increases the risk?  You are more likely to develop this condition if you:  Are exposed to other people who have the infection.  Wear contact lenses.  Have a sinus infection.  Have had a recent eye injury or surgery.  Have a weak body defense system (immune system).  Have a medical condition that causes dry eyes.  What are the signs or symptoms?  Symptoms of this condition include:  Thick, yellowish discharge from the eye. This may turn into a crust on the eyelid overnight and cause your eyelids to stick together.  Tearing or watery eyes.  Itchy eyes.  Burning feeling in your eyes.  Eye redness.  Swollen eyelids.  Blurred vision.  How is this diagnosed?  This condition is diagnosed based on your symptoms and medical history. Your health care provider may also take a sample of discharge from your eye to find the cause of your infection. This is rarely done.  How is this treated?  This condition may be treated with:  Antibiotic eye drops or ointment to clear the infection more quickly and prevent the spread of infection to others.  Oral antibiotic medicines to treat infections that do not respond to drops or ointments or that last longer than 10 days.  Cool, wet cloths (cool compresses) placed on the  eyes.  Artificial tears applied 2-6 times a day.  Follow these instructions at home:  Medicines  Take or apply your antibiotic medicine as told by your health care provider. Do not stop taking or applying the antibiotic even if you start to feel better.  Take or apply over-the-counter and prescription medicines only as told by your health care provider.  Be very careful to avoid touching the edge of your eyelid with the eye-drop bottle or the ointment tube when you apply medicines to the affected eye. This will keep you from spreading the infection to your other eye or to other people.  Managing discomfort  Gently wipe away any drainage from your eye with a warm, wet washcloth or a cotton ball.  Apply a clean, cool compress to your eye for 10-20 minutes, 3-4 times a day.  General instructions  Do not wear contact lenses until the inflammation is gone and your health care provider says it is safe to wear them again. Ask your health care provider how to sterilize or replace your contact lenses before you use them again. Wear glasses until you can resume wearing contact lenses.  Avoid wearing eye makeup until the inflammation is gone. Throw away any old eye cosmetics that may be contaminated.  Change or wash your pillowcase every day.  Do not share towels or washcloths. This may spread the infection.  Wash your hands often with soap and water. Use paper towels to dry your hands.  Avoid touching or rubbing your eyes.  Do not drive or use heavy machinery if your vision is blurred.  Contact a health care provider if:  You have a fever.  Your symptoms do not get better after 10 days.  Get help right away if you have:  A fever and your symptoms suddenly get worse.  Severe pain when you move your eye.  Facial pain, redness, or swelling.  Sudden loss of vision.  Summary  Bacterial conjunctivitis is an infection of the clear membrane that covers the white part of your eye and the inner surface of your eyelid  (conjunctiva).  Bacterial conjunctivitis spreads very easily from person to person (is contagious).  Wash your hands often with soap and water. Use paper towels to dry your hands.  Take or apply your antibiotic medicine as told by your health care provider. Do not stop taking or applying the antibiotic even if you start to feel better.  Contact a health care provider if you have a fever or your symptoms do not get better after 10 days.  This information is not intended to replace advice given to you by your health care provider. Make sure you discuss any questions you have with your health care provider.  Document Revised: 04/07/2020 Document Reviewed: 07/24/2019  [employer] Patient Education © 2021 [employer]

## 2022-12-23 ENCOUNTER — TELEPHONE (OUTPATIENT)
Dept: FAMILY MEDICINE | Facility: CLINIC | Age: 67
End: 2022-12-23
Payer: MEDICARE

## 2023-01-05 ENCOUNTER — OFFICE VISIT (OUTPATIENT)
Dept: INTERNAL MEDICINE | Age: 68
End: 2023-01-05
Payer: MEDICARE

## 2023-01-05 VITALS
DIASTOLIC BLOOD PRESSURE: 82 MMHG | TEMPERATURE: 98.3 F | WEIGHT: 201 LBS | BODY MASS INDEX: 28.77 KG/M2 | SYSTOLIC BLOOD PRESSURE: 138 MMHG | HEART RATE: 85 BPM | OXYGEN SATURATION: 96 % | HEIGHT: 70 IN

## 2023-01-05 DIAGNOSIS — Z12.11 COLON CANCER SCREENING: ICD-10-CM

## 2023-01-05 DIAGNOSIS — I10 HYPERTENSION, UNSPECIFIED TYPE: ICD-10-CM

## 2023-01-05 DIAGNOSIS — I21.4 NSTEMI (NON-ST ELEVATED MYOCARDIAL INFARCTION): ICD-10-CM

## 2023-01-05 DIAGNOSIS — Z76.89 ENCOUNTER TO ESTABLISH CARE: Primary | ICD-10-CM

## 2023-01-05 DIAGNOSIS — R73.09 ELEVATED GLUCOSE: ICD-10-CM

## 2023-01-05 DIAGNOSIS — E78.49 OTHER HYPERLIPIDEMIA: ICD-10-CM

## 2023-01-05 PROBLEM — I25.10 CORONARY ARTERY DISEASE INVOLVING NATIVE CORONARY ARTERY OF NATIVE HEART WITHOUT ANGINA PECTORIS: Status: ACTIVE | Noted: 2021-03-01

## 2023-01-05 PROBLEM — R07.9 CHEST PAIN: Status: ACTIVE | Noted: 2020-06-04

## 2023-01-05 LAB
ALBUMIN SERPL-MCNC: 4.9 G/DL (ref 3.5–5.2)
ALBUMIN/GLOB SERPL: 2.2 G/DL
ALP SERPL-CCNC: 92 U/L (ref 39–117)
ALT SERPL-CCNC: 22 U/L (ref 1–41)
APPEARANCE UR: ABNORMAL
AST SERPL-CCNC: 18 U/L (ref 1–40)
BASOPHILS # BLD AUTO: 0.01 10*3/MM3 (ref 0–0.2)
BASOPHILS NFR BLD AUTO: 0.2 % (ref 0–1.5)
BILIRUB SERPL-MCNC: 0.5 MG/DL (ref 0–1.2)
BILIRUB UR QL STRIP: NEGATIVE
BUN SERPL-MCNC: 17 MG/DL (ref 8–23)
BUN/CREAT SERPL: 18.1 (ref 7–25)
CALCIUM SERPL-MCNC: 9.1 MG/DL (ref 8.6–10.5)
CHLORIDE SERPL-SCNC: 104 MMOL/L (ref 98–107)
CHOLEST SERPL-MCNC: 164 MG/DL (ref 0–200)
CHOLEST/HDLC SERPL: 4.97 {RATIO}
CO2 SERPL-SCNC: 26 MMOL/L (ref 22–29)
COLOR UR: YELLOW
CREAT SERPL-MCNC: 0.94 MG/DL (ref 0.76–1.27)
EGFRCR SERPLBLD CKD-EPI 2021: 88.9 ML/MIN/1.73
EOSINOPHIL # BLD AUTO: 0.11 10*3/MM3 (ref 0–0.4)
EOSINOPHIL NFR BLD AUTO: 2.6 % (ref 0.3–6.2)
ERYTHROCYTE [DISTWIDTH] IN BLOOD BY AUTOMATED COUNT: 12.5 % (ref 12.3–15.4)
GLOBULIN SER CALC-MCNC: 2.2 GM/DL
GLUCOSE SERPL-MCNC: 130 MG/DL (ref 65–99)
GLUCOSE UR QL STRIP: NEGATIVE
HBA1C MFR BLD: 6.1 % (ref 4.8–5.6)
HCT VFR BLD AUTO: 45.5 % (ref 37.5–51)
HDLC SERPL-MCNC: 33 MG/DL (ref 40–60)
HGB BLD-MCNC: 15.5 G/DL (ref 13–17.7)
HGB UR QL STRIP: NEGATIVE
IMM GRANULOCYTES # BLD AUTO: 0.03 10*3/MM3 (ref 0–0.05)
IMM GRANULOCYTES NFR BLD AUTO: 0.7 % (ref 0–0.5)
KETONES UR QL STRIP: NEGATIVE
LDLC SERPL CALC-MCNC: 99 MG/DL (ref 0–100)
LEUKOCYTE ESTERASE UR QL STRIP: NEGATIVE
LYMPHOCYTES # BLD AUTO: 1.34 10*3/MM3 (ref 0.7–3.1)
LYMPHOCYTES NFR BLD AUTO: 31.2 % (ref 19.6–45.3)
MCH RBC QN AUTO: 30.5 PG (ref 26.6–33)
MCHC RBC AUTO-ENTMCNC: 34.1 G/DL (ref 31.5–35.7)
MCV RBC AUTO: 89.4 FL (ref 79–97)
MONOCYTES # BLD AUTO: 0.44 10*3/MM3 (ref 0.1–0.9)
MONOCYTES NFR BLD AUTO: 10.2 % (ref 5–12)
NEUTROPHILS # BLD AUTO: 2.37 10*3/MM3 (ref 1.7–7)
NEUTROPHILS NFR BLD AUTO: 55.1 % (ref 42.7–76)
NITRITE UR QL STRIP: NEGATIVE
NRBC BLD AUTO-RTO: 0 /100 WBC (ref 0–0.2)
PH UR STRIP: 5.5 [PH] (ref 5–8)
PLATELET # BLD AUTO: 197 10*3/MM3 (ref 140–450)
POTASSIUM SERPL-SCNC: 4.2 MMOL/L (ref 3.5–5.2)
PROT SERPL-MCNC: 7.1 G/DL (ref 6–8.5)
PROT UR QL STRIP: NEGATIVE
RBC # BLD AUTO: 5.09 10*6/MM3 (ref 4.14–5.8)
SODIUM SERPL-SCNC: 140 MMOL/L (ref 136–145)
SP GR UR STRIP: 1.02 (ref 1–1.03)
T4 FREE SERPL-MCNC: 1.11 NG/DL (ref 0.93–1.7)
TRIGL SERPL-MCNC: 184 MG/DL (ref 0–150)
TSH SERPL DL<=0.005 MIU/L-ACNC: 1.25 UIU/ML (ref 0.27–4.2)
UROBILINOGEN UR STRIP-MCNC: ABNORMAL MG/DL
VLDLC SERPL CALC-MCNC: 32 MG/DL (ref 5–40)
WBC # BLD AUTO: 4.3 10*3/MM3 (ref 3.4–10.8)

## 2023-01-05 PROCEDURE — 99214 OFFICE O/P EST MOD 30 MIN: CPT

## 2023-01-05 RX ORDER — ATORVASTATIN CALCIUM 80 MG/1
80 TABLET, FILM COATED ORAL DAILY
Qty: 90 TABLET | Refills: 0 | Status: SHIPPED | OUTPATIENT
Start: 2023-01-05

## 2023-01-05 RX ORDER — VITAMIN B COMPLEX
TABLET ORAL
COMMUNITY

## 2023-01-05 RX ORDER — METOPROLOL SUCCINATE 25 MG/1
25 TABLET, EXTENDED RELEASE ORAL DAILY
Qty: 90 TABLET | Refills: 0 | Status: SHIPPED | OUTPATIENT
Start: 2023-01-05 | End: 2023-02-06 | Stop reason: SDUPTHER

## 2023-01-05 NOTE — PROGRESS NOTES
I N T E R N A L  M E D I C I N E  Heidi Davis, APRN    ENCOUNTER DATE:  01/05/2023    Leobardo Cunningham / 67 y.o. / male      CHIEF COMPLAINT / REASON FOR OFFICE VISIT     Establish Care      ASSESSMENT & PLAN     Diagnoses and all orders for this visit:    1. Encounter to establish care (Primary)    2. NSTEMI (non-ST elevated myocardial infarction) ([facility])  Overview:  Last Assessment & Plan:   Formatting of this note might be different from the original.  Left heart cath revealed 95% mid RCA occlusion, s/p PAULETTE. Plan is to continue DAPT for at least a year, aspirin/ticagrelor, continue high intensity statin, Continue beta blocker.    Orders:  -     Ambulatory Referral to Cardiology    3. Hypertension, unspecified type  Overview:  Last Assessment & Plan:   Formatting of this note might be different from the original.  On Toprol XL 50 milligrams daily.  Will continue.    Orders:  -     Ambulatory Referral to Cardiology  -     metoprolol succinate XL (Toprol XL) 25 MG 24 hr tablet; Take 1 tablet by mouth Daily.  Dispense: 90 tablet; Refill: 0  -     CBC & Differential  -     Comprehensive Metabolic Panel  -     TSH+Free T4  -     Urinalysis without microscopic (no culture) - Urine, Clean Catch    4. Other hyperlipidemia  -     Ambulatory Referral to Cardiology  -     atorvastatin (Lipitor) 80 MG tablet; Take 1 tablet by mouth Daily.  Dispense: 90 tablet; Refill: 0  -     Lipid Panel With / Chol / HDL Ratio    5. Elevated glucose  -     Hemoglobin A1c    6. Colon cancer screening  -     Ambulatory Referral For Screening Colonoscopy       SUMMARY/DISCUSSION  • Agreeable to update labs today.  Declined PSA lab test.    • Educated on importance of beta blocker/ statin for cardiovascular disease prevention.  He is agreeable to resume both, and also to establish with cardiology.      Next Appointment with me: Visit date not found    Return in about 3 months (around 4/5/2023) for Medicare Wellness.      VITAL SIGNS      Visit Vitals  /82   Pulse 85   Temp 98.3 °F (36.8 °C)   Ht 177.8 cm (70\")   Wt 91.2 kg (201 lb)   SpO2 96%   BMI 28.84 kg/m²             Wt Readings from Last 3 Encounters:   01/05/23 91.2 kg (201 lb)     Body mass index is 28.84 kg/m².        MEDICATIONS AT THE TIME OF OFFICE VISIT     Current Outpatient Medications on File Prior to Visit   Medication Sig Dispense Refill   • aspirin 81 MG EC tablet Take 81 mg by mouth.     • Coenzyme Q10 (CoQ10) 100 MG capsule Take  by mouth.     • VITAMIN D PO Take  by mouth.       No current facility-administered medications on file prior to visit.        HISTORY OF PRESENT ILLNESS     Here to establish care.  Recently moved to [place] from [place] to be near family and grandson.      Medical hx significant for NSTEMI in 2020 with stent placement.  He remains on aspirin 81 mg daily.  He has not yet established with cardiology here in [place].  He was formerly on metoprolol succinate 25 mg once daily, but stopped last year because he simply ran out.   BP mildly elevated today, 138/82.  He rarely checks at home, but when he does, it averages 130s-140s/70s.    He was formerly on atorvastatin 80 mg nightly for HLD, but also stopped last year after running out.    Today, he feels completely well.  Denies any chest pain, palpitations, dyspnea, exercise intolerance, lower extremity swelling.        Patient Care Team:  Heidi Davis APRN as PCP - General (Family Medicine)    REVIEW OF SYSTEMS     Review of Systems   Constitutional: Negative for chills, fever and unexpected weight change.   Respiratory: Negative for cough, chest tightness and shortness of breath.    Cardiovascular: Negative for chest pain, palpitations and leg swelling.   Neurological: Negative for dizziness, weakness, light-headedness and headaches.   Psychiatric/Behavioral: The patient is not nervous/anxious.           PHYSICAL EXAMINATION     Physical Exam  Vitals reviewed.   Constitutional:        General: He is not in acute distress.     Appearance: Normal appearance. He is not ill-appearing, toxic-appearing or diaphoretic.   HENT:      Head: Normocephalic and atraumatic.   Cardiovascular:      Rate and Rhythm: Normal rate and regular rhythm.      Heart sounds: Normal heart sounds.   Pulmonary:      Effort: Pulmonary effort is normal.      Breath sounds: Normal breath sounds.   Musculoskeletal:      Right lower leg: No edema.      Left lower leg: No edema.   Neurological:      Mental Status: He is alert and oriented to person, place, and time. Mental status is at baseline.   Psychiatric:         Mood and Affect: Mood normal.         Behavior: Behavior normal.         Thought Content: Thought content normal.         Judgment: Judgment normal.           REVIEWED DATA     Labs:           Imaging:            Medical Tests:           Summary of old records / correspondence / consultant report:           Request outside records:

## 2023-02-06 ENCOUNTER — OFFICE VISIT (OUTPATIENT)
Dept: CARDIOLOGY | Facility: CLINIC | Age: 68
End: 2023-02-06
Payer: MEDICARE

## 2023-02-06 VITALS
BODY MASS INDEX: 29.35 KG/M2 | HEIGHT: 70 IN | HEART RATE: 82 BPM | SYSTOLIC BLOOD PRESSURE: 144 MMHG | DIASTOLIC BLOOD PRESSURE: 80 MMHG | WEIGHT: 205 LBS

## 2023-02-06 DIAGNOSIS — I10 HYPERTENSION, UNSPECIFIED TYPE: ICD-10-CM

## 2023-02-06 PROCEDURE — 93000 ELECTROCARDIOGRAM COMPLETE: CPT | Performed by: INTERNAL MEDICINE

## 2023-02-06 PROCEDURE — 99204 OFFICE O/P NEW MOD 45 MIN: CPT | Performed by: INTERNAL MEDICINE

## 2023-02-06 RX ORDER — METOPROLOL SUCCINATE 50 MG/1
50 TABLET, EXTENDED RELEASE ORAL DAILY
Qty: 90 TABLET | Refills: 3 | Status: SHIPPED | OUTPATIENT
Start: 2023-02-06

## 2023-02-06 NOTE — PROGRESS NOTES
Leobardo Cunningham  1955  Date of Office Visit: 02/06/23  Encounter Provider: Gary Lr MD  Place of Service: [facility] CARDIOLOGY      CHIEF COMPLAINT:  History of non-ST elevation MI  PCI to the RCA  Hyperlipidemia    HISTORY OF PRESENT ILLNESS:  67-year-old with a medical history of coronary artery disease, prior non-ST elevation MI in 2020, PCI to the RCA, moderate proximal LAD stenosis, preserved ejection fraction, who presents to me as a transfer of care.  He has been doing very well as of late.  He did not take any medication last year and is now back on aspirin, statin and Toprol therapy.  His lab work in January is not representative of his lipid panel on statin.  His blood pressure has been mildly elevated even on Toprol therapy.  He denies any chest pain or dyspnea.  He is staying active with no limitation.    Review of Systems   Constitutional: Negative for fever and malaise/fatigue.   HENT: Negative for nosebleeds and sore throat.    Eyes: Negative for blurred vision and double vision.   Cardiovascular: Negative for chest pain, claudication, palpitations and syncope.   Respiratory: Negative for cough, shortness of breath and snoring.    Endocrine: Negative for cold intolerance, heat intolerance and polydipsia.   Skin: Negative for itching, poor wound healing and rash.   Musculoskeletal: Negative for joint pain, joint swelling, muscle weakness and myalgias.   Gastrointestinal: Negative for abdominal pain, melena, nausea and vomiting.   Neurological: Negative for light-headedness, loss of balance, seizures, vertigo and weakness.   Psychiatric/Behavioral: Negative for altered mental status and depression.          Past Medical History:   Diagnosis Date   • Myocardial infarction (HCC) June 2020    Heart episode stint placed       The following portions of the patient's history were reviewed and updated as appropriate: Social history , Family history and  "Surgical history     Current Outpatient Medications on File Prior to Visit   Medication Sig Dispense Refill   • aspirin 81 MG EC tablet Take 81 mg by mouth.     • atorvastatin (Lipitor) 80 MG tablet Take 1 tablet by mouth Daily. 90 tablet 0   • Coenzyme Q10 (CoQ10) 100 MG capsule Take  by mouth.     • metoprolol succinate XL (Toprol XL) 25 MG 24 hr tablet Take 1 tablet by mouth Daily. 90 tablet 0   • VITAMIN D PO Take  by mouth.       No current facility-administered medications on file prior to visit.       No Known Allergies    Vitals:    02/06/23 0856   Height: 177.8 cm (70\")     Body mass index is 28.84 kg/m².   Constitutional:       Appearance: Well-developed.   Eyes:      General: No scleral icterus.     Conjunctiva/sclera: Conjunctivae normal.   HENT:      Head: Normocephalic and atraumatic.   Neck:      Thyroid: No thyromegaly.      Vascular: Normal carotid pulses. No carotid bruit, hepatojugular reflux or JVD.      Trachea: No tracheal deviation.   Pulmonary:      Effort: No respiratory distress.      Breath sounds: Normal breath sounds. No decreased breath sounds. No wheezing. No rhonchi. No rales.   Chest:      Chest wall: Not tender to palpatation.   Cardiovascular:      Normal rate. Regular rhythm.      No gallop.   Pulses:     Carotid: 2+ bilaterally.     Radial: 2+ bilaterally.     Femoral: 2+ bilaterally.     Dorsalis pedis: 2+ bilaterally.     Posterior tibial: 2+ bilaterally.  Edema:     Peripheral edema absent.   Abdominal:      General: Bowel sounds are normal. There is no distension.      Palpations: Abdomen is soft.      Tenderness: There is no abdominal tenderness.   Musculoskeletal:         General: No deformity.      Cervical back: Normal range of motion and neck supple. Skin:     Findings: No erythema or rash.   Neurological:      Mental Status: Alert and oriented to person, place, and time.      Sensory: No sensory deficit.   Psychiatric:         Behavior: Behavior normal.            Lab " Results   Component Value Date    WBC 4.30 01/05/2023    HGB 15.5 01/05/2023    HCT 45.5 01/05/2023    MCV 89.4 01/05/2023     01/05/2023       Lab Results   Component Value Date    GLUCOSE 130 (H) 01/05/2023    BUN 17 01/05/2023    CREATININE 0.94 01/05/2023    EGFRIFNONA >60 06/05/2020    EGFRIFAFRI >60 06/05/2020    BCR 18.1 01/05/2023    K 4.2 01/05/2023    CO2 26.0 01/05/2023    CALCIUM 9.1 01/05/2023    PROTENTOTREF 7.1 01/05/2023    ALBUMIN 4.9 01/05/2023    LABIL2 2.2 01/05/2023    AST 18 01/05/2023    ALT 22 01/05/2023       Lab Results   Component Value Date    GLUCOSE 130 (H) 01/05/2023    CALCIUM 9.1 01/05/2023     01/05/2023    K 4.2 01/05/2023    CO2 26.0 01/05/2023     01/05/2023    BUN 17 01/05/2023    CREATININE 0.94 01/05/2023    EGFRIFAFRI >60 06/05/2020    EGFRIFNONA >60 06/05/2020    BCR 18.1 01/05/2023    ANIONGAP 16 06/05/2020       Lab Results   Component Value Date    CHLPL 164 01/05/2023    TRIG 184 (H) 01/05/2023    HDL 33 (L) 01/05/2023    LDL 99 01/05/2023         ECG 12 Lead    Date/Time: 2/6/2023 12:23 PM  Performed by: Gary Lr MD  Authorized by: Gary Lr MD   Comparison: not compared with previous ECG   Rhythm: sinus rhythm  Ectopy: unifocal PVCs  Rate: normal  QRS axis: normal             6/15/21  · The left ventricle is normal in size with normal systolic function. The   estimated ejection fraction is 55%.   · Normal right ventricular size with normal right ventricular systolic   function.   · Normal left ventricular diastolic function.   · The aortic root is mildly dilated.   · Normal central venous pressure (3 mmHg).   · The patient's exercise capacity was below average.   · The ECG portion of this study is negative for myocardial ischemia.   · The stress echo portion of this study is negative for myocardial   ischemia.   · Overall, this study is negative for myocardial ischemia.     6/10/2020  Findings:     1.  LM normal.   2.  LAD  proximal calcified 50% with a medium-to-large size first diagonal   branch with ostial 30% disease.  Mid LAD mild diffuse disease up to 30%.     Left-to-right [id number] collaterals can be seen to fill the   right-sided PDA prior to stenting.   3.  LCx prox-mid 50-60% involving the takeoff of a large size OM that has   proximal 60-70% disease.  There is discrete mid-distal 40% lesion that   supplies a distal small size OM.   4.  RCA prox-mid 95% disease with thrombus s/p stent there is 0% residual   disease and LITO 3 flow.  Right dominant system with a medium size PDA and   PLB with mild disease less than 30%.   5.  LVEDP 26 mmHg.         DISCUSSION/SUMMARY  Very pleasant 67-year-old male with a medical history of coronary artery disease, prior non-ST elevation MI, prior PCI to the RCA, essential hypertension, hyperlipidemia who presents to me as a transfer of care.  He is doing very well.  His lipid panel was not at goal, however he was not taking his statin as of last year.  His blood pressure is mildly elevated on Toprol therapy and we have discussed better control.    He denies any new symptoms.  He has no chest pain, dyspnea, lower extremity edema orthopnea or PND    1.  Coronary disease with prior non-ST elevation MI and PCI to the RCA  -No angina  - Continue aspirin 81 mg p.o. daily  - Continue Lipitor 80 mg p.o. nightly along with Toprol.  I would recommend increasing the Toprol to 50 mg p.o. daily.  He is aware.    2.  Hyperlipidemia: Not at goal on last lab work in January 2023.  He has not been compliant with statin therapy.  Recheck in 3 to 6 months while on statin therapy.    3.  Essential hypertension: Not at goal.  Increase Toprol as in orders.    I will see him back in 6 to 9 months

## 2023-02-28 ENCOUNTER — PRE-PROCEDURE SCREENING (OUTPATIENT)
Dept: GASTROENTEROLOGY | Facility: CLINIC | Age: 68
End: 2023-02-28
Payer: MEDICARE

## 2023-02-28 NOTE — TELEPHONE ENCOUNTER
LAST SCOPE 2013 IN Epic --Personal history of polyps--No family history of polyps or colon cancer--ASPIRIN--Medications:            aspirin 81 MG EC tablet  atorvastatin (Lipitor) 80 MG tablet  Coenzyme Q10 (CoQ10) 100 MG capsule    metoprolol succinate XL (Toprol XL) 50 MG 24 hr tablet  promethazine-dextromethorphan (PROMETHAZINE-DM) 6.25-15 MG/5ML syrup  VITAMIN D PO               QUESTIONNAIRE SCREENING  SCAN IN  MEDIA & HAS BEEN SENT TO DOCTOR FOR REVIEW

## 2023-03-02 ENCOUNTER — PREP FOR SURGERY (OUTPATIENT)
Dept: OTHER | Facility: HOSPITAL | Age: 68
End: 2023-03-02
Payer: MEDICARE

## 2023-03-02 DIAGNOSIS — K63.5 COLON POLYPS: Primary | ICD-10-CM

## 2023-03-28 ENCOUNTER — TELEPHONE (OUTPATIENT)
Dept: GASTROENTEROLOGY | Facility: CLINIC | Age: 68
End: 2023-03-28
Payer: MEDICARE

## 2023-03-28 NOTE — TELEPHONE ENCOUNTER
SPOKE WITH PATIENT ASKED IF WE COULD GIVE HIM A CALL BACK IF POSSIBLE TODAY OR TOMORROW . WANTING TO CHARLES HIM FOR COLONOSOCY

## 2023-04-05 ENCOUNTER — OFFICE VISIT (OUTPATIENT)
Dept: INTERNAL MEDICINE | Age: 68
End: 2023-04-05
Payer: MEDICARE

## 2023-04-05 VITALS
WEIGHT: 199 LBS | BODY MASS INDEX: 27.86 KG/M2 | DIASTOLIC BLOOD PRESSURE: 76 MMHG | OXYGEN SATURATION: 96 % | TEMPERATURE: 98.2 F | HEIGHT: 71 IN | SYSTOLIC BLOOD PRESSURE: 124 MMHG | HEART RATE: 85 BPM

## 2023-04-05 DIAGNOSIS — Z23 ENCOUNTER FOR IMMUNIZATION: ICD-10-CM

## 2023-04-05 DIAGNOSIS — Z12.5 PROSTATE CANCER SCREENING: ICD-10-CM

## 2023-04-05 DIAGNOSIS — R73.03 PREDIABETES: ICD-10-CM

## 2023-04-05 DIAGNOSIS — I10 PRIMARY HYPERTENSION: ICD-10-CM

## 2023-04-05 DIAGNOSIS — E78.5 HYPERLIPIDEMIA, UNSPECIFIED HYPERLIPIDEMIA TYPE: ICD-10-CM

## 2023-04-05 DIAGNOSIS — Z00.00 ENCOUNTER FOR ANNUAL WELLNESS VISIT (AWV) IN MEDICARE PATIENT: Primary | ICD-10-CM

## 2023-04-05 DIAGNOSIS — R01.1 HEART MURMUR: ICD-10-CM

## 2023-04-05 NOTE — PROGRESS NOTES
I N T E R N A L  M E D I C I MARU E  YARELY AYALA      ENCOUNTER DATE:  04/05/2023    Leobardo Cunningham / 67 y.o. / male        MEDICARE ANNUAL WELLNESS VISIT       Chief Complaint: Medicare Wellness-subsequent       Patient's general assessment of his health since a year ago:     - Compared to one year ago, he feels his physical health is slightly better.    - Compared to one year ago, he feels his mental health is about the same without significant change.      HPI for other active medical problems:     HTN/ History of NSTEMI with stent placement: Now followed by cardiology, Dr. Lr.  Increased Toprol to 50 mg daily. BP well controlled at today's visit.  Remains on aspirin 81 mg daily.       HLD: He has resumed use of atorvastatin 80 mg daily, but is not fasting at today's appointment.      Today, he feels completely well.  Denies any chest pain, palpitations, dyspnea, exercise intolerance, lower extremity swelling.          HISTORY       Recent Hospitalizations:    Recent hospitalization?: NO    If YES, location, date, and diagnoses:     · Location:   · Date:   · Principle Discharge Dx:   · Secondary Dx:       Patient Care Team:    Patient Care Team:  Heidi Davis APRN as PCP - General (Family Medicine)  Gary Lr MD as Consulting Physician (Cardiology)      Allergies:  Patient has no known allergies.    Medications:  Current Outpatient Medications on File Prior to Visit   Medication Sig Dispense Refill   • aspirin 81 MG EC tablet Take 1 tablet by mouth.     • atorvastatin (Lipitor) 80 MG tablet Take 1 tablet by mouth Daily. 90 tablet 0   • Coenzyme Q10 (CoQ10) 100 MG capsule Take  by mouth.     • metoprolol succinate XL (Toprol XL) 50 MG 24 hr tablet Take 1 tablet by mouth Daily. 90 tablet 3   • VITAMIN D PO Take  by mouth.     • [DISCONTINUED] promethazine-dextromethorphan (PROMETHAZINE-DM) 6.25-15 MG/5ML syrup 5 ml PO Q 4-6 hours prn cough.  Max:  30 ml per 24 hours. 240 mL 0  "    No current facility-administered medications on file prior to visit.        PFSH:     The following portions of the patient's history were reviewed and updated as appropriate: Allergies / Current Medications / Past Medical History / Surgical History / Social History / Family History    Problem List:  Patient Active Problem List   Diagnosis   • Hyperlipidemia   • Hypertension   • NSTEMI (non-ST elevated myocardial infarction)   • Seborrheic dermatitis   • Chest pain   • Coronary artery disease involving native coronary artery of native heart without angina pectoris       Past Medical History:  Past Medical History:   Diagnosis Date   • Myocardial infarction June 2020    Heart episode stint placed       Past Surgical History:  Past Surgical History:   Procedure Laterality Date   • COLONOSCOPY  2005    I think   • CORONARY ARTERY BYPASS GRAFT  June 2020       Social History:  Social History     Socioeconomic History   • Marital status:    Tobacco Use   • Smoking status: Never   • Smokeless tobacco: Never   Vaping Use   • Vaping Use: Never used   Substance and Sexual Activity   • Alcohol use: Yes     Alcohol/week: 2.0 standard drinks     Types: 1 Glasses of wine, 1 Cans of beer per week   • Drug use: Never   • Sexual activity: Yes     Partners: Female     Birth control/protection: None       Family History:  Family History   Problem Relation Age of Onset   • Multiple myeloma Mother    • Prostate cancer Father    • Heart attack Brother    • Coronary artery disease Brother          PATIENT ASSESSMENT     Vitals:  Vitals:    04/05/23 0937   BP: 124/76   Pulse: 85   Temp: 98.2 °F (36.8 °C)   SpO2: 96%   Weight: 90.3 kg (199 lb)   Height: 180.3 cm (70.98\")       BP Readings from Last 3 Encounters:   04/05/23 124/76   02/22/23 138/80   02/06/23 144/80     Wt Readings from Last 3 Encounters:   04/05/23 90.3 kg (199 lb)   02/22/23 88.5 kg (195 lb)   02/06/23 93 kg (205 lb)      Body mass index is 27.77 " kg/m².    [unfilled]        Review of Systems:    Review of Systems   Constitutional: Negative for chills, fever and unexpected weight change.   Respiratory: Negative for cough, chest tightness and shortness of breath.    Cardiovascular: Negative for chest pain, palpitations and leg swelling.   Neurological: Negative for dizziness, weakness, light-headedness and headaches.   Psychiatric/Behavioral: The patient is not nervous/anxious.        Physical Exam:    Physical Exam  Vitals reviewed.   Constitutional:       General: He is not in acute distress.     Appearance: Normal appearance. He is not ill-appearing, toxic-appearing or diaphoretic.   HENT:      Head: Normocephalic and atraumatic.      Right Ear: Tympanic membrane, ear canal and external ear normal. There is no impacted cerumen.      Left Ear: Tympanic membrane, ear canal and external ear normal. There is no impacted cerumen.      Nose: Nose normal. No congestion or rhinorrhea.      Mouth/Throat:      Mouth: Mucous membranes are moist.      Pharynx: Oropharynx is clear. No oropharyngeal exudate or posterior oropharyngeal erythema.   Eyes:      Extraocular Movements: Extraocular movements intact.      Conjunctiva/sclera: Conjunctivae normal.      Pupils: Pupils are equal, round, and reactive to light.   Cardiovascular:      Rate and Rhythm: Normal rate and regular rhythm.      Heart sounds: Murmur heard.   Pulmonary:      Effort: Pulmonary effort is normal. No respiratory distress.      Breath sounds: Normal breath sounds.   Abdominal:      General: Bowel sounds are normal.      Palpations: Abdomen is soft.      Tenderness: There is no abdominal tenderness.   Musculoskeletal:         General: Normal range of motion.      Cervical back: Normal range of motion and neck supple.      Right lower leg: No edema.      Left lower leg: No edema.   Lymphadenopathy:      Cervical: No cervical adenopathy.   Skin:     General: Skin is warm and dry.   Neurological:       General: No focal deficit present.      Mental Status: He is alert and oriented to person, place, and time. Mental status is at baseline.   Psychiatric:         Mood and Affect: Mood normal.         Behavior: Behavior normal.         Thought Content: Thought content normal.         Judgment: Judgment normal.           Reviewed Data:    Labs:   Lab Results   Component Value Date     01/05/2023    K 4.2 01/05/2023    CALCIUM 9.1 01/05/2023    AST 18 01/05/2023    ALT 22 01/05/2023    BUN 17 01/05/2023    CREATININE 0.94 01/05/2023    CREATININE 0.87 06/05/2020    CREATININE 0.82 06/04/2020    EGFRIFNONA >60 06/05/2020    EGFRIFAFRI >60 06/05/2020       Lab Results   Component Value Date     (H) 06/05/2020     (H) 06/04/2020    HGBA1C 6.10 (H) 01/05/2023       Lab Results   Component Value Date    LDL 99 01/05/2023    HDL 33 (L) 01/05/2023    TRIG 184 (H) 01/05/2023    CHOLHDLRATIO 4.97 01/05/2023       Lab Results   Component Value Date    TSH 1.250 01/05/2023    FREET4 1.11 01/05/2023          Lab Results   Component Value Date    WBC 4.30 01/05/2023    HGB 15.5 01/05/2023     01/05/2023                 No results found for: PSA    Imaging:          Medical Tests:          Screening for Glaucoma:  Previous screening for glaucoma?: No, does have plans to schedule      Hearing Loss Screen:  Finger Rub Hearing Test (right ear): passed  Finger Rub Hearing Test (left ear): passed      Urinary Incontinence Screen:  Episodes of urinary incontinence? : No      Depression Screen:  PHQ-2/PHQ-9 Depression Screening 4/5/2023   Little Interest or Pleasure in Doing Things 0-->not at all   Feeling Down, Depressed or Hopeless 0-->not at all   PHQ-9: Brief Depression Severity Measure Score 0        PHQ-2: 0 (Not depressed)    PHQ-9: 0 (Negative screening for depression)       FUNCTIONAL, FALL RISK, & COGNITIVE SCREENING (Components below):    DATA:    Functional & Cognitive Status 4/5/2023   Do you have  difficulty preparing food and eating? No   Do you have difficulty bathing yourself, getting dressed or grooming yourself? No   Do you have difficulty using the toilet? No   Do you have difficulty moving around from place to place? No   Do you have trouble with steps or getting out of a bed or a chair? No   Current Diet Well Balanced Diet   Dental Exam Not up to date   Eye Exam Not up to date   Exercise (times per week) 7 times per week   Current Exercises Include Walking   Do you need help using the phone?  No   Are you deaf or do you have serious difficulty hearing?  No   Do you need help with transportation? No   Do you need help shopping? No   Do you need help preparing meals?  No   Do you need help with housework?  No   Do you need help with laundry? No   Do you need help taking your medications? No   Do you need help managing money? No   Do you ever drive or ride in a car without wearing a seat belt? No   Do you have difficulty concentrating, remembering or making decisions? No         A) Assessment of Functional Ability:  (Assessment of ability to perform ADL's (showering/bathing, using toilet, dressing, feeding self, moving self around) and IADL's (use telephone, shop, prepare food, housekeep, do laundry, transport independently, take medications independently, and handle finances)    Degree of functional impairment: NONE (based on assessment noted above)      B) Assessment of Fall Risk:  Fall Risk Assessment was completed, and patient is at low risk for falls.       Need for further evaluation of gait, strength, and balance? : No    Timed Up and Go (TUG):   (>= 12 seconds indicates high risk for falling)    Observable abnormalities included: Normal gait pattern       C. Assessment of Cognitive Function:    Mini-Cog Test:     1) Registration (3 objects): Yes   2) Number of objects recalled: 3   3) Clock Draw: Passed? : N/A       Further evaluation required? : No        COUNSELING       A. Identification of  Health Risk Factors:    Risk factors include: cardiovascular risk factors      B. Age-Appropriate Screening Schedule:  (Refer to the list below for future screening recommendations based on patient's age, sex and/or medical conditions. Orders for these recommended tests are listed in the plan section. The patient has been provided with a written plan)    Health Maintenance Topics  Health Maintenance   Topic Date Due   • COLORECTAL CANCER SCREENING  Never done   • ZOSTER VACCINE (1 of 2) Never done   • Pneumococcal Vaccine 65+ (1 - PCV) Never done   • INFLUENZA VACCINE  08/01/2023   • LIPID PANEL  01/05/2024   • ANNUAL WELLNESS VISIT  04/05/2024   • TDAP/TD VACCINES (3 - Td or Tdap) 10/31/2030   • HEPATITIS C SCREENING  Completed   • COVID-19 Vaccine  Completed       Health Maintenance Topics Due or Over-Due  Health Maintenance Due   Topic Date Due   • COLORECTAL CANCER SCREENING  Never done   • ZOSTER VACCINE (1 of 2) Never done   • Pneumococcal Vaccine 65+ (1 - PCV) Never done         C. Advanced Care Planning:    Advance Care Planning   ACP discussion was held with the patient during this visit. Patient does not have an advance directive, information provided.       D. Patient Self-Management and Personalized Health Advice:    He has been provided with personalized counseling/information (including brochures/handouts) about:     -- optimizing diet/nutrition plans, improving exercise / conditioning, weight management, reducing risk for cardiac/vascular events with pre-existing disease and designing advance directives      He has been recommended for the following preventative services which has been performed today, will be ordered today or ordered/performed on upcoming follow-up visit:     -- PROSTATE CANCER screening (discussed and PSA ordered +/- SCARLET performed), **COLORECTAL cancer screening (colonoscopy/Cologuard discussed or ordered), vaccination for PNEUMOVAX/PCV administered (or recommended), vaccination for  SHINGRIX administered  (or recommended), COVID-19 vaccination (and/or booster) recommendations provided      E. Miscellaneous Items:    -Aspirin use counseling: Taking ASA appropriately as indicated    -Discussed BMI with him. The BMI is above average; BMI management plan is completed (discussed plans for weight loss)    -Reviewed use of high risk medication in the elderly: YES    -Reviewed for potential of harmful drug interactions in the elderly: YES        WRAP UP       Assessment & Plan:    1) MEDICARE ANNUAL WELLNESS VISIT    2) OTHER MEDICAL CONDITIONS ADDRESSED TODAY:            Problem List Items Addressed This Visit        Cardiac and Vasculature    Hyperlipidemia    Relevant Medications    atorvastatin (Lipitor) 80 MG tablet    Other Relevant Orders    Lipid Panel With / Chol / HDL Ratio    Hypertension    Overview     Last Assessment & Plan:   Formatting of this note might be different from the original.  On Toprol XL 50 milligrams daily.  Will continue.         Relevant Medications    metoprolol succinate XL (Toprol XL) 50 MG 24 hr tablet    Other Relevant Orders    Comprehensive Metabolic Panel   Other Visit Diagnoses     Encounter for annual wellness visit (AWV) in Medicare patient    -  Primary    Prediabetes        Relevant Orders    Comprehensive Metabolic Panel    Hemoglobin A1c    Prostate cancer screening        Relevant Orders    PSA Screen    Heart murmur        Relevant Orders    Adult Transthoracic Echo Complete W/ Cont if Necessary Per Protocol    Encounter for immunization        Relevant Orders    COVID-19 Bivalent Booster (Pfizer) 12+yrs (Completed)                    Orders Placed This Encounter   Procedures   • COVID-19 Bivalent Booster (Pfizer) 12+yrs   • Comprehensive Metabolic Panel   • Hemoglobin A1c   • Lipid Panel With / Chol / HDL Ratio   • PSA Screen   • Adult Transthoracic Echo Complete W/ Cont if Necessary Per Protocol       Discussion / Summary:    Agreeable to scheduling  fasting lab appointment in a few days to check labs.  Will check into updating Shingles/ PNA vaccines at pharmacy.  Will update ECHO given potential new heart murmur auscultated at today's appointment.      Medications as of TODAY:              Current Outpatient Medications   Medication Sig Dispense Refill   • aspirin 81 MG EC tablet Take 1 tablet by mouth.     • atorvastatin (Lipitor) 80 MG tablet Take 1 tablet by mouth Daily. 90 tablet 0   • Coenzyme Q10 (CoQ10) 100 MG capsule Take  by mouth.     • metoprolol succinate XL (Toprol XL) 50 MG 24 hr tablet Take 1 tablet by mouth Daily. 90 tablet 3   • VITAMIN D PO Take  by mouth.       No current facility-administered medications for this visit.         FOLLOW-UP:            Return in about 4 months (around 8/5/2023) for Schedule lab only appt, then 4 month chronic care appointment, then 1 year AWV.                 Future Appointments   Date Time Provider Department Center   4/10/2023  8:50 AM [facility]   8/7/2023 11:00 AM Kalie Nieves APRN [facility]   10/11/2023  9:15 AM Heidi Davis APRN [facility]   4/8/2024 10:00 AM Heidi Davis APRN [facility]           After Visit Summary (AVS) including the Personalized Prevention  Plan Services (PPPS) was either printed and given to the patient at check-out today and/or sent to [employer] for review.       [unfilled]

## 2023-04-07 ENCOUNTER — TELEPHONE (OUTPATIENT)
Dept: GASTROENTEROLOGY | Facility: CLINIC | Age: 68
End: 2023-04-07
Payer: MEDICARE

## 2023-04-10 ENCOUNTER — TELEPHONE (OUTPATIENT)
Dept: GASTROENTEROLOGY | Facility: CLINIC | Age: 68
End: 2023-04-10
Payer: MEDICARE

## 2023-04-11 ENCOUNTER — TELEPHONE (OUTPATIENT)
Dept: GASTROENTEROLOGY | Facility: CLINIC | Age: 68
End: 2023-04-11

## 2023-04-11 NOTE — TELEPHONE ENCOUNTER
"  Hub staff attempted to follow warm transfer process and was unsuccessful     Caller: Leobardo Cunningham \"Marck\"    Relationship to patient: Self    Best call back number: [phone]    Patient is needing: PT RETURNING MISSED CALL TO SCHEDULE SCOPE PLEASE CALL BACK  "

## 2023-04-26 ENCOUNTER — HOSPITAL ENCOUNTER (OUTPATIENT)
Dept: CARDIOLOGY | Facility: HOSPITAL | Age: 68
Discharge: HOME | End: 2023-04-26
Payer: MEDICARE

## 2023-04-26 VITALS
DIASTOLIC BLOOD PRESSURE: 98 MMHG | WEIGHT: 199 LBS | SYSTOLIC BLOOD PRESSURE: 145 MMHG | HEART RATE: 77 BPM | OXYGEN SATURATION: 91 % | BODY MASS INDEX: 28.49 KG/M2 | HEIGHT: 70 IN

## 2023-04-26 DIAGNOSIS — R01.1 HEART MURMUR: ICD-10-CM

## 2023-04-26 LAB
AORTIC ARCH: 2.8 CM
AORTIC DIMENSIONLESS INDEX: 0.4 (DI)
ASCENDING AORTA: 3.8 CM
BH CV ECHO MEAS - ACS: 1.81 CM
BH CV ECHO MEAS - AO MAX PG: 10.9 MMHG
BH CV ECHO MEAS - AO MEAN PG: 5.8 MMHG
BH CV ECHO MEAS - AO ROOT DIAM: 3.9 CM
BH CV ECHO MEAS - AO V2 MAX: 165.1 CM/SEC
BH CV ECHO MEAS - AO V2 VTI: 43.7 CM
BH CV ECHO MEAS - AVA(I,D): 1.62 CM2
BH CV ECHO MEAS - EDV(CUBED): 195.7 ML
BH CV ECHO MEAS - EDV(MOD-SP2): 126 ML
BH CV ECHO MEAS - EDV(MOD-SP4): 122 ML
BH CV ECHO MEAS - EF(MOD-BP): 56.6 %
BH CV ECHO MEAS - EF(MOD-SP2): 54.8 %
BH CV ECHO MEAS - EF(MOD-SP4): 56.6 %
BH CV ECHO MEAS - ESV(CUBED): 51.5 ML
BH CV ECHO MEAS - ESV(MOD-SP2): 57 ML
BH CV ECHO MEAS - ESV(MOD-SP4): 53 ML
BH CV ECHO MEAS - FS: 35.9 %
BH CV ECHO MEAS - IVS/LVPW: 1.12 CM
BH CV ECHO MEAS - IVSD: 0.96 CM
BH CV ECHO MEAS - LAT PEAK E' VEL: 10.1 CM/SEC
BH CV ECHO MEAS - LV DIASTOLIC VOL/BSA (35-75): 58.6 CM2
BH CV ECHO MEAS - LV MASS(C)D: 206 GRAMS
BH CV ECHO MEAS - LV MAX PG: 3.8 MMHG
BH CV ECHO MEAS - LV MEAN PG: 2.06 MMHG
BH CV ECHO MEAS - LV SYSTOLIC VOL/BSA (12-30): 25.4 CM2
BH CV ECHO MEAS - LV V1 MAX: 97.4 CM/SEC
BH CV ECHO MEAS - LV V1 VTI: 19.3 CM
BH CV ECHO MEAS - LVIDD: 5.8 CM
BH CV ECHO MEAS - LVIDS: 3.7 CM
BH CV ECHO MEAS - LVOT AREA: 3.7 CM2
BH CV ECHO MEAS - LVOT DIAM: 2.16 CM
BH CV ECHO MEAS - LVPWD: 0.85 CM
BH CV ECHO MEAS - MED PEAK E' VEL: 5.7 CM/SEC
BH CV ECHO MEAS - MV A DUR: 0.13 SEC
BH CV ECHO MEAS - MV A MAX VEL: 98.1 CM/SEC
BH CV ECHO MEAS - MV DEC SLOPE: 612.6 CM/SEC2
BH CV ECHO MEAS - MV DEC TIME: 0.17 MSEC
BH CV ECHO MEAS - MV E MAX VEL: 98.1 CM/SEC
BH CV ECHO MEAS - MV E/A: 1
BH CV ECHO MEAS - MV MAX PG: 4.6 MMHG
BH CV ECHO MEAS - MV MEAN PG: 2.39 MMHG
BH CV ECHO MEAS - MV P1/2T: 52.8 MSEC
BH CV ECHO MEAS - MV V2 VTI: 27.7 CM
BH CV ECHO MEAS - MVA(P1/2T): 4.2 CM2
BH CV ECHO MEAS - MVA(VTI): 2.6 CM2
BH CV ECHO MEAS - PA ACC TIME: 0.09 SEC
BH CV ECHO MEAS - PA PR(ACCEL): 37.8 MMHG
BH CV ECHO MEAS - PA V2 MAX: 70.4 CM/SEC
BH CV ECHO MEAS - PULM A REVS DUR: 0.14 SEC
BH CV ECHO MEAS - PULM A REVS VEL: 30.1 CM/SEC
BH CV ECHO MEAS - PULM DIAS VEL: 29.3 CM/SEC
BH CV ECHO MEAS - PULM S/D: 1.8
BH CV ECHO MEAS - PULM SYS VEL: 52.7 CM/SEC
BH CV ECHO MEAS - QP/QS: 0.83
BH CV ECHO MEAS - RV MAX PG: 1.76 MMHG
BH CV ECHO MEAS - RV V1 MAX: 66.4 CM/SEC
BH CV ECHO MEAS - RV V1 VTI: 15.5 CM
BH CV ECHO MEAS - RVOT DIAM: 2.19 CM
BH CV ECHO MEAS - SI(MOD-SP2): 33.1 ML/M2
BH CV ECHO MEAS - SI(MOD-SP4): 33.1 ML/M2
BH CV ECHO MEAS - SV(LVOT): 70.8 ML
BH CV ECHO MEAS - SV(MOD-SP2): 69 ML
BH CV ECHO MEAS - SV(MOD-SP4): 69 ML
BH CV ECHO MEAS - SV(RVOT): 58.4 ML
BH CV ECHO MEAS - TAPSE (>1.6): 3.5 CM
BH CV ECHO MEAS - TR MAX PG: 19.1 MMHG
BH CV ECHO MEAS - TR MAX VEL: 218.5 CM/SEC
BH CV ECHO MEASUREMENTS AVERAGE E/E' RATIO: 12.42
BH CV XLRA - TDI S': 13.9 CM/SEC
LEFT ATRIUM VOLUME INDEX: 15.7 ML/M2
MAXIMAL PREDICTED HEART RATE: 153 BPM
SINUS: 3.7 CM
STJ: 3 CM
STRESS TARGET HR: 130 BPM

## 2023-04-26 PROCEDURE — 25510000001 PERFLUTREN (DEFINITY) 8.476 MG IN SODIUM CHLORIDE (PF) 0.9 % 10 ML INJECTION

## 2023-04-26 PROCEDURE — 93306 TTE W/DOPPLER COMPLETE: CPT | Performed by: INTERNAL MEDICINE

## 2023-04-26 PROCEDURE — 93306 TTE W/DOPPLER COMPLETE: CPT

## 2023-04-26 RX ADMIN — PERFLUTREN 2 ML: 6.52 INJECTION, SUSPENSION INTRAVENOUS at 13:18

## 2023-04-27 ENCOUNTER — TELEPHONE (OUTPATIENT)
Dept: INTERNAL MEDICINE | Age: 68
End: 2023-04-27
Payer: MEDICARE

## 2023-04-27 NOTE — TELEPHONE ENCOUNTER
Called back about test results said he seen his results on my chart and doesn't believe he needs anything at the moment.

## 2023-10-11 ENCOUNTER — TELEPHONE (OUTPATIENT)
Dept: INTERNAL MEDICINE | Age: 68
End: 2023-10-11

## 2023-10-11 ENCOUNTER — OFFICE VISIT (OUTPATIENT)
Dept: INTERNAL MEDICINE | Age: 68
End: 2023-10-11
Payer: MEDICARE

## 2023-10-11 VITALS
SYSTOLIC BLOOD PRESSURE: 122 MMHG | TEMPERATURE: 97.7 F | OXYGEN SATURATION: 96 % | WEIGHT: 205 LBS | BODY MASS INDEX: 29.35 KG/M2 | HEIGHT: 70 IN | DIASTOLIC BLOOD PRESSURE: 76 MMHG | HEART RATE: 59 BPM

## 2023-10-11 DIAGNOSIS — E78.5 HYPERLIPIDEMIA, UNSPECIFIED HYPERLIPIDEMIA TYPE: ICD-10-CM

## 2023-10-11 DIAGNOSIS — B35.1 ONYCHOMYCOSIS OF RIGHT GREAT TOE: ICD-10-CM

## 2023-10-11 DIAGNOSIS — I10 PRIMARY HYPERTENSION: Primary | ICD-10-CM

## 2023-10-11 DIAGNOSIS — Z23 FLU VACCINE NEED: ICD-10-CM

## 2023-10-11 DIAGNOSIS — Z12.11 ENCOUNTER FOR SCREENING COLONOSCOPY: ICD-10-CM

## 2023-10-11 DIAGNOSIS — R73.03 PREDIABETES: ICD-10-CM

## 2023-10-11 LAB
ALBUMIN SERPL-MCNC: 4.9 G/DL (ref 3.5–5.2)
ALBUMIN/GLOB SERPL: 2.2 G/DL
ALP SERPL-CCNC: 93 U/L (ref 39–117)
ALT SERPL-CCNC: 28 U/L (ref 1–41)
AST SERPL-CCNC: 24 U/L (ref 1–40)
BILIRUB SERPL-MCNC: 0.6 MG/DL (ref 0–1.2)
BUN SERPL-MCNC: 19 MG/DL (ref 8–23)
BUN/CREAT SERPL: 17.6 (ref 7–25)
CALCIUM SERPL-MCNC: 9.8 MG/DL (ref 8.6–10.5)
CHLORIDE SERPL-SCNC: 102 MMOL/L (ref 98–107)
CHOLEST SERPL-MCNC: 168 MG/DL (ref 0–200)
CHOLEST/HDLC SERPL: 4.42 {RATIO}
CO2 SERPL-SCNC: 27.5 MMOL/L (ref 22–29)
CREAT SERPL-MCNC: 1.08 MG/DL (ref 0.76–1.27)
EGFRCR SERPLBLD CKD-EPI 2021: 74.7 ML/MIN/1.73
GLOBULIN SER CALC-MCNC: 2.2 GM/DL
GLUCOSE SERPL-MCNC: 123 MG/DL (ref 65–99)
HBA1C MFR BLD: 6.3 % (ref 4.8–5.6)
HDLC SERPL-MCNC: 38 MG/DL (ref 40–60)
LDLC SERPL CALC-MCNC: 101 MG/DL (ref 0–100)
POTASSIUM SERPL-SCNC: 4.5 MMOL/L (ref 3.5–5.2)
PROT SERPL-MCNC: 7.1 G/DL (ref 6–8.5)
SODIUM SERPL-SCNC: 139 MMOL/L (ref 136–145)
TRIGL SERPL-MCNC: 163 MG/DL (ref 0–150)
VLDLC SERPL CALC-MCNC: 29 MG/DL (ref 5–40)

## 2023-10-11 PROCEDURE — 90662 IIV NO PRSV INCREASED AG IM: CPT

## 2023-10-11 PROCEDURE — 99214 OFFICE O/P EST MOD 30 MIN: CPT

## 2023-10-11 PROCEDURE — 3074F SYST BP LT 130 MM HG: CPT

## 2023-10-11 PROCEDURE — G0008 ADMIN INFLUENZA VIRUS VAC: HCPCS

## 2023-10-11 PROCEDURE — 1159F MED LIST DOCD IN RCRD: CPT

## 2023-10-11 PROCEDURE — 1160F RVW MEDS BY RX/DR IN RCRD: CPT

## 2023-10-11 PROCEDURE — 3078F DIAST BP <80 MM HG: CPT

## 2023-10-11 RX ORDER — ATORVASTATIN CALCIUM 80 MG/1
80 TABLET, FILM COATED ORAL DAILY
Qty: 90 TABLET | Refills: 1 | Status: SHIPPED | OUTPATIENT
Start: 2023-10-11

## 2023-10-11 NOTE — TELEPHONE ENCOUNTER
"    Caller: Leobardo Cunningham \"Marck\"    Relationship: Self    Best call back number: [phone]     What medications are you currently taking:   Current Outpatient Medications on File Prior to Visit   Medication Sig Dispense Refill    aspirin 81 MG EC tablet Take 1 tablet by mouth.      atorvastatin (Lipitor) 80 MG tablet Take 1 tablet by mouth Daily. 90 tablet 1    Coenzyme Q10 (CoQ10) 100 MG capsule Take  by mouth.      COVID-19 mRNA Vac-Alia,Pfizer, 30 MCG/0.3ML suspension Inject 0.3 mL into the appropriate muscle as directed by prescriber 1 (One) Time for 1 dose. 0.3 mL 0    metoprolol succinate XL (Toprol XL) 50 MG 24 hr tablet Take 1 tablet by mouth Daily. 90 tablet 3    VITAMIN D PO Take  by mouth.      [DISCONTINUED] atorvastatin (Lipitor) 80 MG tablet Take 1 tablet by mouth Daily. 90 tablet 0     No current facility-administered medications on file prior to visit.              What are your concerns:       PATIENT WAS UNDER THE UNDERSTANDING THAT YOU WHERE GOING TO SEND IN A SCRIPT FOR A TOE FUNGUS ON HIS RIGHT FOOT.      PLEASE ADVISE.      [facility] [address] [facility] [phone] [facility] [phone] FX   "

## 2023-10-11 NOTE — LETTER
"[facility]  Vaccine Consent Form    Patient Name:  Leobardo Cunningham  Patient :  1955     Vaccine(s) Ordered    Fluzone High-Dose 65+yrs        Screening Checklist  The following questions should be completed prior to vaccination. If you answer "yes" to any question, it does not necessarily mean you should not be vaccinated. It just means we may need to clarify or ask more questions. If a question is unclear, please ask your healthcare provider to explain it.    Yes No   Any fever or moderate to severe illness today (mild illness and/or antibiotic treatment are not contraindications)?     Do you have a history of a serious reaction to any previous vaccinations, such as anaphylaxis, encephalopathy within 7 days, Guillain-Fosston syndrome within 6 weeks, seizure?     Have you received any live vaccine(s) in the past month (MMR, ERMA)?     Do you have an anaphylactic allergy to latex (DTaP, DTaP-IPV, Hep A, Hep B, MenB, RV, Td, Tdap), baker's yeast (Hep B, HPV), or gelatin (ERMA, MMR)?     Do you have an anaphylactic allergy to neomycin (Rabies, ERMA, MMR, IPV, Hep A), polymyxin B (IPV), or streptomycin (IPV)?      Any cancer, leukemia, AIDS, or other immune system disorder? (REMA, MMR, RV)     Do you have a parent, brother, or sister with an immune system problem (if immune competence of vaccine recipient clinically verified, can proceed)? (MMR, ERMA)     Any recent steroid treatments for >2 weeks, chemotherapy, or radiation treatment? (ERMA, MMR)     Have you received antibody-containing blood transfusions or IVIG in the past 11 months (recommended interval is dependent on product)? (MMR, ERMA)     Have you taken antiviral drugs (acyclovir, famciclovir, valacyclovir) in the last 24 or 48 hours, respectively (ERMA)?      Are you pregnant or planning to become pregnant within 1 month? (ERMA, MMR, HPV, IPV, MenB; For hep B- refer to Engerix-B)     For infants, have you ever been told your child has had " "intussusception or a medical emergency involving obstruction of the intestine (RV)? If not for an infant, can skip this question.         *Ordering Physician/APC should be consulted if "yes" is checked by the patient or guardian above.      I have received, read, and understand the Vaccine Information Statement (VIS) for each vaccine ordered above.  I have considered my health status as well as the health status of my close contacts.  I have taken the opportunity to discuss my vaccine questions with my health care provider.   I have requested that the ordered vaccine(s) be given to me.  I understand the benefits and risks of the vaccines.  I understand that I should remain in the clinic for 15 minutes after receiving the vaccine(s).  _________________________________________________________  Signature of Patient or Parent/Legal Guardian ____________________  Date     "

## 2023-10-11 NOTE — PROGRESS NOTES
"    I N T E R N A L  M E D I C I N E  Heidi Davis, APRN    ENCOUNTER DATE:  10/11/2023    Leobardo Cunningham / 68 y.o. / male      CHIEF COMPLAINT / REASON FOR OFFICE VISIT     Hyperlipidemia and Hypertension      ASSESSMENT & PLAN     Diagnoses and all orders for this visit:    1. Primary hypertension (Primary)  Overview:  Last Assessment & Plan:   Formatting of this note might be different from the original.  On Toprol XL 50 milligrams daily.  Will continue.    Orders:  -     Comprehensive Metabolic Panel    2. Hyperlipidemia, unspecified hyperlipidemia type  -     atorvastatin (Lipitor) 80 MG tablet; Take 1 tablet by mouth Daily.  Dispense: 90 tablet; Refill: 1  -     Comprehensive Metabolic Panel  -     Lipid Panel With / Chol / HDL Ratio    3. Prediabetes  -     Hemoglobin A1c    4. Onychomycosis of right great toe  -     Efinaconazole 10 % solution; Apply 1 drop topically Daily for 336 days.  Dispense: 8 mL; Refill: 0    5. Encounter for screening colonoscopy  -     Ambulatory Referral For Screening Colonoscopy    6. Flu vaccine need  -     Fluzone High-Dose 65+yrs         SUMMARY/DISCUSSION  Reschedule with cardiology.  Recommend COVID-19, RSV and pneumonia vaccines at pharmacy.     BMI is >= 25 and <30. (Overweight) The following options were offered after discussion;: exercise counseling/recommendations and nutrition counseling/recommendations        Next Appointment with me: 4/8/2024    Return for Scheduled for AWV in April 2024..      VITAL SIGNS     Visit Vitals  /76   Pulse 59   Temp 97.7 øF (36.5 øC)   Ht 177.8 cm (70\")   Wt 93 kg (205 lb)   SpO2 96%   BMI 29.41 kg/mý             Wt Readings from Last 3 Encounters:   10/11/23 93 kg (205 lb)   04/26/23 90.3 kg (199 lb)   04/05/23 90.3 kg (199 lb)     Body mass index is 29.41 kg/mý.        MEDICATIONS AT THE TIME OF OFFICE VISIT     Current Outpatient Medications on File Prior to Visit   Medication Sig Dispense Refill    aspirin 81 MG EC tablet " Take 1 tablet by mouth.      Coenzyme Q10 (CoQ10) 100 MG capsule Take  by mouth.      metoprolol succinate XL (Toprol XL) 50 MG 24 hr tablet Take 1 tablet by mouth Daily. 90 tablet 3    VITAMIN D PO Take  by mouth.      [DISCONTINUED] atorvastatin (Lipitor) 80 MG tablet Take 1 tablet by mouth Daily. 90 tablet 0     No current facility-administered medications on file prior to visit.        HISTORY OF PRESENT ILLNESS     HTN/ History of NSTEMI with stent placement: Followed by cardiology, Dr. Lr.  August 2023 cardiology follow up was canceled; he is aware that he needs to reschedule.  Remains on Toprol 50 mg daily. BP well controlled at today's visit, 122/76.  Remains on aspirin 81 mg daily without signs/ symptoms of bleeding. Denies any chest pain, palpitations, dyspnea, exercise intolerance, lower extremity swelling.      HLD: On atorvastatin 80 mg daily with April 2023 lipid panel with normal triglycerides 86; LDL 88.    Prediabetes:  April 2023 A1C 5.9.     He has not yet scheduled colonoscopy screening, but is agreeable for new referral.    He report right great toenail brown discoloration for many years.  No prior treatment for fungal infection.   Patient Care Team:  Heidi Davis APRN as PCP - General (Family Medicine)  Gary Lr MD as Consulting Physician (Cardiology)    REVIEW OF SYSTEMS     Review of Systems   Constitutional:  Negative for chills, fever and unexpected weight change.   Respiratory:  Negative for cough, chest tightness and shortness of breath.    Cardiovascular:  Negative for chest pain, palpitations and leg swelling.   Neurological:  Negative for dizziness, weakness, light-headedness and headaches.   Psychiatric/Behavioral:  The patient is not nervous/anxious.           PHYSICAL EXAMINATION     Physical Exam  Vitals reviewed.   Constitutional:       General: He is not in acute distress.     Appearance: Normal appearance. He is not ill-appearing, toxic-appearing or  diaphoretic.   HENT:      Head: Normocephalic and atraumatic.   Cardiovascular:      Rate and Rhythm: Normal rate and regular rhythm.      Heart sounds: Normal heart sounds.   Pulmonary:      Effort: Pulmonary effort is normal.      Breath sounds: Normal breath sounds.   Musculoskeletal:      Left lower leg: No edema.   Feet:      Right foot:      Toenail Condition: Fungal disease present.  Neurological:      Mental Status: He is alert and oriented to person, place, and time. Mental status is at baseline.   Psychiatric:         Mood and Affect: Mood normal.         Behavior: Behavior normal.         Thought Content: Thought content normal.         Judgment: Judgment normal.           REVIEWED DATA     Labs:           Imaging:            Medical Tests:           Summary of old records / correspondence / consultant report:           Request outside records:

## 2023-10-13 DIAGNOSIS — B35.1 ONYCHOMYCOSIS OF RIGHT GREAT TOE: Primary | ICD-10-CM

## 2024-01-10 ENCOUNTER — OFFICE VISIT (OUTPATIENT)
Dept: CARDIOLOGY | Facility: CLINIC | Age: 69
End: 2024-01-10
Payer: MEDICARE

## 2024-01-10 VITALS
HEIGHT: 70 IN | WEIGHT: 199 LBS | SYSTOLIC BLOOD PRESSURE: 124 MMHG | BODY MASS INDEX: 28.49 KG/M2 | DIASTOLIC BLOOD PRESSURE: 78 MMHG | HEART RATE: 76 BPM

## 2024-01-10 DIAGNOSIS — E78.5 HYPERLIPIDEMIA, UNSPECIFIED HYPERLIPIDEMIA TYPE: Primary | ICD-10-CM

## 2024-01-10 DIAGNOSIS — Z95.5 S/P DRUG ELUTING CORONARY STENT PLACEMENT: ICD-10-CM

## 2024-01-10 DIAGNOSIS — R73.03 PREDIABETES: ICD-10-CM

## 2024-01-10 DIAGNOSIS — I10 PRIMARY HYPERTENSION: ICD-10-CM

## 2024-01-10 DIAGNOSIS — I49.3 PVC (PREMATURE VENTRICULAR CONTRACTION): ICD-10-CM

## 2024-01-10 DIAGNOSIS — I25.10 CORONARY ARTERY DISEASE INVOLVING NATIVE CORONARY ARTERY OF NATIVE HEART WITHOUT ANGINA PECTORIS: ICD-10-CM

## 2024-01-10 PROCEDURE — 93000 ELECTROCARDIOGRAM COMPLETE: CPT | Performed by: NURSE PRACTITIONER

## 2024-01-10 PROCEDURE — 99214 OFFICE O/P EST MOD 30 MIN: CPT | Performed by: NURSE PRACTITIONER

## 2024-01-10 PROCEDURE — 3078F DIAST BP <80 MM HG: CPT | Performed by: NURSE PRACTITIONER

## 2024-01-10 PROCEDURE — 3074F SYST BP LT 130 MM HG: CPT | Performed by: NURSE PRACTITIONER

## 2024-01-10 NOTE — PROGRESS NOTES
Date of Office Visit: 01/10/2024  Encounter Provider: YARELY Calderón  Place of Service: [facility] CARDIOLOGY  Patient Name: Leobardo Cunningham  :1955    No chief complaint on file.  : follow up    HPI: Leobardo Cunningham is a 68 y.o. male who is a patient of  Dr. Lr.  He is new to me today and presents for 1 year office follow-up.  He has a history of coronary artery disease in .  He was found to have proximal- mid 95% RCA disease with thrombus.  He underwent PCI with Xience Skypoint drug-eluting stent in proximal to mid RCA.  Stress echocardiogram 6/15/2021 was negative for myocardial ischemia and showed normal LV EF.    Was last seen in the office in 2023.  At that time, he was staying active without any complications.  At that time, patient noted that he had not taken medications the year prior, and was back to taking them.      Presents today with no complaints.  He works at [employer] as a  and walks about 7035-1997 steps a day without any problems.  Blood pressure is well-controlled.  EKG shows sinus rhythm with PVCs.  He has no complaints of palpitations, chest pain or lightheadedness.  He follows on a normal basis with his PCP.       Previous testing and notes have been reviewed by me.   Past Medical History:   Diagnosis Date    Myocardial infarction 2020    Heart episode stint placed       Past Surgical History:   Procedure Laterality Date    COLONOSCOPY      I think    CORONARY ARTERY BYPASS GRAFT  2020       Social History     Socioeconomic History    Marital status:    Tobacco Use    Smoking status: Never    Smokeless tobacco: Never   Vaping Use    Vaping Use: Never used   Substance and Sexual Activity    Alcohol use: Yes     Alcohol/week: 2.0 standard drinks of alcohol     Types: 1 Glasses of wine, 1 Cans of beer per week    Drug use: Never    Sexual activity: Yes     Partners: Female     Birth  "control/protection: None       Family History   Problem Relation Age of Onset    Multiple myeloma Mother     Prostate cancer Father     Heart attack Brother     Coronary artery disease Brother        Review of Systems   Constitutional: Negative.   HENT: Negative.     Eyes: Negative.    Cardiovascular: Negative.    Respiratory: Negative.     Endocrine: Negative.    Hematologic/Lymphatic: Negative.    Skin: Negative.    Musculoskeletal: Negative.    Gastrointestinal: Negative.    Genitourinary: Negative.    Neurological: Negative.    Psychiatric/Behavioral: Negative.     Allergic/Immunologic: Negative.        No Known Allergies      Current Outpatient Medications:     aspirin 81 MG EC tablet, Take 1 tablet by mouth., Disp: , Rfl:     atorvastatin (Lipitor) 80 MG tablet, Take 1 tablet by mouth Daily., Disp: 90 tablet, Rfl: 1    ciclopirox (PENLAC) 8 % solution, Apply  topically to the appropriate area as directed Every Night for 336 days., Disp: 6 mL, Rfl: 0    Coenzyme Q10 (CoQ10) 100 MG capsule, Take  by mouth., Disp: , Rfl:     metoprolol succinate XL (Toprol XL) 50 MG 24 hr tablet, Take 1 tablet by mouth Daily., Disp: 90 tablet, Rfl: 3    VITAMIN D PO, Take  by mouth., Disp: , Rfl:     RSVPreF3 Vac Recomb Adjuvanted (Arexvy) 120 MCG/0.5ML reconstituted suspension injection, Inject  into the appropriate muscle as directed by prescriber. (Patient not taking: Reported on 1/10/2024), Disp: 1 each, Rfl: 0    Zoster Vac Recomb Adjuvanted (Shingrix) 50 MCG/0.5ML reconstituted suspension, Inject  into the appropriate muscle as directed by prescriber. (Patient not taking: Reported on 1/10/2024), Disp: 1 each, Rfl: 0      Objective:     Vitals:    01/10/24 1254   BP: 124/78   Pulse: 76   Weight: 90.3 kg (199 lb)   Height: 177.8 cm (70\")     Body mass index is 28.55 kg/m².    2D Echocardiogram 4/26/2023:    Left ventricular systolic function is normal. Calculated left ventricular EF = 56.6%    There is calcification of the " aortic valve. No hemodynamically significant aortic valve stenosis is present    Estimated right ventricular systolic pressure from tricuspid regurgitation is normal (<35 mmHg).    Trace to mild tricuspid valve regurgitation is present     Stress Echocardiogram 06/15/2021:  · The left ventricle is normal in size with normal systolic function. The   estimated ejection fraction is 55%.   · Normal right ventricular size with normal right ventricular systolic   function.   · Normal left ventricular diastolic function.   · The aortic root is mildly dilated.   · Normal central venous pressure (3 mmHg).   · The patient's exercise capacity was below average.   · The ECG portion of this study is negative for myocardial ischemia.   · The stress echo portion of this study is negative for myocardial   ischemia.   · Overall, this study is negative for myocardial ischemia.     Left Heart Cath 06/10/2020: ([place])  1.  LM normal.   2.  LAD proximal calcified 50% with a medium-to-large size first diagonal   branch with ostial 30% disease.  Mid LAD mild diffuse disease up to 30%.    Left-to-right [id number] collaterals can be seen to fill the   right-sided PDA prior to stenting.   3.  LCx prox-mid 50-60% involving the takeoff of a large size OM that has   proximal 60-70% disease.  There is discrete mid-distal 40% lesion that   supplies a distal small size OM.   4.  RCA prox-mid 95% disease with thrombus s/p stent there is 0% residual   disease and LITO 3 flow.  Right dominant system with a medium size PDA and   PLB with mild disease less than 30%.   5.  LVEDP 26 mmHg.     PHYSICAL EXAM:    Constitutional:       Appearance: Healthy appearance. Not in distress.   Neck:      Vascular: No JVR. JVD normal.   Pulmonary:      Effort: Pulmonary effort is normal.      Breath sounds: Normal breath sounds. No wheezing. No rhonchi. No rales.   Chest:      Chest wall: Not tender to palpatation.   Cardiovascular:      PMI at left  midclavicular line. Normal rate. Regular rhythm. Normal S1. Normal S2.       Murmurs: There is no murmur.      No gallop.  No click. No rub.   Pulses:     Intact distal pulses.   Edema:     Peripheral edema absent.   Abdominal:      General: Bowel sounds are normal.      Palpations: Abdomen is soft.      Tenderness: There is no abdominal tenderness.   Musculoskeletal: Normal range of motion.         General: No tenderness. Skin:     General: Skin is warm and dry.   Neurological:      General: No focal deficit present.      Mental Status: Alert and oriented to person, place and time.           ECG 12 Lead    Date/Time: 1/10/2024 1:40 PM  Performed by: Kalie Nieves APRN    Authorized by: Kalie Nieves APRN  Comparison: compared with previous ECG from 2/6/2023  Similar to previous ECG  Rhythm: sinus rhythm  Ectopy: unifocal PVCs  BPM: 76            Assessment:       Diagnosis Plan   1. Hyperlipidemia, unspecified hyperlipidemia type        2. Primary hypertension        3. Coronary artery disease involving native coronary artery of native heart without angina pectoris        4. S/P drug eluting coronary stent placement        5. Prediabetes        6. PVC (premature ventricular contraction)          Orders Placed This Encounter   Procedures    ECG 12 Lead     This order was created via procedure documentation     Order Specific Question:   Release to patient     Answer:   Routine Release [[id number]]          Plan:       1.  Coronary artery disease: Status post PCI/PAULETTE to RCA in 2020.  On aspirin, statin high-dose statin and beta-blocker.  No angina.  Stable EKG    2.  Hyperlipidemia goal LDL less than 70: Fasting lipid panel in October 2023 showed  and triglycerides 163.  He notes that he was not taking his atorvastatin on a normal basis at the time of the labs and now is taking on a regular basis.  He has also made dietary modifications.  By PCP    3.  Prediabetes: Recent HbA1c in October 2023 was  6.3.  Dietary modifications.  Followed by PCP    4.  Hypertension: Controlled    5.  PVC: asymptomatic.  On beta blocker.     Mr. Cunningham will follow up with Dr. Lr in 1 year.  He will call sooner for any questions or concerns.       Your medication list            Accurate as of January 10, 2024  1:41 PM. If you have any questions, ask your nurse or doctor.                CONTINUE taking these medications        Instructions Last Dose Given Next Dose Due   Arexvy 120 MCG/0.5ML reconstituted suspension injection  Generic drug: RSVPreF3 Vac Recomb Adjuvanted      Inject  into the appropriate muscle as directed by prescriber.       aspirin 81 MG EC tablet      Take 1 tablet by mouth.       atorvastatin 80 MG tablet  Commonly known as: Lipitor      Take 1 tablet by mouth Daily.       ciclopirox 8 % solution  Commonly known as: PENLAC      Apply  topically to the appropriate area as directed Every Night for 336 days.       CoQ10 100 MG capsule      Take  by mouth.       metoprolol succinate XL 50 MG 24 hr tablet  Commonly known as: Toprol XL      Take 1 tablet by mouth Daily.       Shingrix 50 MCG/0.5ML reconstituted suspension  Generic drug: Zoster Vac Recomb Adjuvanted      Inject  into the appropriate muscle as directed by prescriber.       VITAMIN D PO      Take  by mouth.                  As always, it has been a pleasure to participate in your patient's care.      Sincerely,       YARELY Small

## 2024-04-02 DIAGNOSIS — E78.5 HYPERLIPIDEMIA, UNSPECIFIED HYPERLIPIDEMIA TYPE: ICD-10-CM

## 2024-04-03 RX ORDER — ATORVASTATIN CALCIUM 80 MG/1
80 TABLET, FILM COATED ORAL DAILY
Qty: 90 TABLET | Refills: 1 | Status: SHIPPED | OUTPATIENT
Start: 2024-04-03

## 2024-05-06 ENCOUNTER — OFFICE VISIT (OUTPATIENT)
Dept: INTERNAL MEDICINE | Age: 69
End: 2024-05-06
Payer: MEDICARE

## 2024-05-06 VITALS
HEIGHT: 70 IN | TEMPERATURE: 93.7 F | SYSTOLIC BLOOD PRESSURE: 144 MMHG | BODY MASS INDEX: 29.06 KG/M2 | WEIGHT: 203 LBS | HEART RATE: 82 BPM | DIASTOLIC BLOOD PRESSURE: 86 MMHG | OXYGEN SATURATION: 96 %

## 2024-05-06 DIAGNOSIS — Z00.00 ENCOUNTER FOR ANNUAL WELLNESS VISIT (AWV) IN MEDICARE PATIENT: Primary | ICD-10-CM

## 2024-05-06 DIAGNOSIS — Z12.5 PROSTATE CANCER SCREENING: ICD-10-CM

## 2024-05-06 DIAGNOSIS — I10 PRIMARY HYPERTENSION: ICD-10-CM

## 2024-05-06 DIAGNOSIS — Z12.11 COLON CANCER SCREENING: ICD-10-CM

## 2024-05-06 DIAGNOSIS — E78.5 HYPERLIPIDEMIA, UNSPECIFIED HYPERLIPIDEMIA TYPE: ICD-10-CM

## 2024-05-06 DIAGNOSIS — R73.03 PREDIABETES: ICD-10-CM

## 2024-05-06 PROCEDURE — G0439 PPPS, SUBSEQ VISIT: HCPCS

## 2024-05-06 PROCEDURE — 1159F MED LIST DOCD IN RCRD: CPT

## 2024-05-06 PROCEDURE — 1170F FXNL STATUS ASSESSED: CPT

## 2024-05-06 PROCEDURE — 3079F DIAST BP 80-89 MM HG: CPT

## 2024-05-06 PROCEDURE — 99214 OFFICE O/P EST MOD 30 MIN: CPT

## 2024-05-06 PROCEDURE — 3077F SYST BP >= 140 MM HG: CPT

## 2024-05-06 PROCEDURE — 1160F RVW MEDS BY RX/DR IN RCRD: CPT

## 2024-05-07 LAB
ALBUMIN SERPL-MCNC: 4.6 G/DL (ref 3.5–5.2)
ALBUMIN/GLOB SERPL: 2.1 G/DL
ALP SERPL-CCNC: 94 U/L (ref 39–117)
ALT SERPL-CCNC: 22 U/L (ref 1–41)
APPEARANCE UR: CLEAR
AST SERPL-CCNC: 20 U/L (ref 1–40)
BASOPHILS # BLD AUTO: 0.01 10*3/MM3 (ref 0–0.2)
BASOPHILS NFR BLD AUTO: 0.2 % (ref 0–1.5)
BILIRUB SERPL-MCNC: 0.9 MG/DL (ref 0–1.2)
BILIRUB UR QL STRIP: NEGATIVE
BUN SERPL-MCNC: 17 MG/DL (ref 8–23)
BUN/CREAT SERPL: 16.3 (ref 7–25)
CALCIUM SERPL-MCNC: 9.2 MG/DL (ref 8.6–10.5)
CHLORIDE SERPL-SCNC: 102 MMOL/L (ref 98–107)
CHOLEST SERPL-MCNC: 151 MG/DL (ref 0–200)
CHOLEST/HDLC SERPL: 4.31 {RATIO}
CO2 SERPL-SCNC: 24.4 MMOL/L (ref 22–29)
COLOR UR: YELLOW
CREAT SERPL-MCNC: 1.04 MG/DL (ref 0.76–1.27)
EGFRCR SERPLBLD CKD-EPI 2021: 78.2 ML/MIN/1.73
EOSINOPHIL # BLD AUTO: 0.05 10*3/MM3 (ref 0–0.4)
EOSINOPHIL NFR BLD AUTO: 0.9 % (ref 0.3–6.2)
ERYTHROCYTE [DISTWIDTH] IN BLOOD BY AUTOMATED COUNT: 13 % (ref 12.3–15.4)
GLOBULIN SER CALC-MCNC: 2.2 GM/DL
GLUCOSE SERPL-MCNC: 109 MG/DL (ref 65–99)
GLUCOSE UR QL STRIP: NEGATIVE
HBA1C MFR BLD: 6.3 % (ref 4.8–5.6)
HCT VFR BLD AUTO: 43.9 % (ref 37.5–51)
HDLC SERPL-MCNC: 35 MG/DL (ref 40–60)
HGB BLD-MCNC: 14.9 G/DL (ref 13–17.7)
HGB UR QL STRIP: NEGATIVE
IMM GRANULOCYTES # BLD AUTO: 0.02 10*3/MM3 (ref 0–0.05)
IMM GRANULOCYTES NFR BLD AUTO: 0.3 % (ref 0–0.5)
KETONES UR QL STRIP: NEGATIVE
LDLC SERPL CALC-MCNC: 89 MG/DL (ref 0–100)
LEUKOCYTE ESTERASE UR QL STRIP: NEGATIVE
LYMPHOCYTES # BLD AUTO: 0.85 10*3/MM3 (ref 0.7–3.1)
LYMPHOCYTES NFR BLD AUTO: 14.8 % (ref 19.6–45.3)
MCH RBC QN AUTO: 30.3 PG (ref 26.6–33)
MCHC RBC AUTO-ENTMCNC: 33.9 G/DL (ref 31.5–35.7)
MCV RBC AUTO: 89.4 FL (ref 79–97)
MONOCYTES # BLD AUTO: 0.58 10*3/MM3 (ref 0.1–0.9)
MONOCYTES NFR BLD AUTO: 10.1 % (ref 5–12)
NEUTROPHILS # BLD AUTO: 4.22 10*3/MM3 (ref 1.7–7)
NEUTROPHILS NFR BLD AUTO: 73.7 % (ref 42.7–76)
NITRITE UR QL STRIP: NEGATIVE
NRBC BLD AUTO-RTO: 0 /100 WBC (ref 0–0.2)
PH UR STRIP: 6 [PH] (ref 5–8)
PLATELET # BLD AUTO: 190 10*3/MM3 (ref 140–450)
POTASSIUM SERPL-SCNC: 4.4 MMOL/L (ref 3.5–5.2)
PROT SERPL-MCNC: 6.8 G/DL (ref 6–8.5)
PROT UR QL STRIP: NEGATIVE
PSA SERPL-MCNC: 1.25 NG/ML (ref 0–4)
RBC # BLD AUTO: 4.91 10*6/MM3 (ref 4.14–5.8)
SODIUM SERPL-SCNC: 138 MMOL/L (ref 136–145)
SP GR UR STRIP: 1.02 (ref 1–1.03)
T4 FREE SERPL-MCNC: 0.89 NG/DL (ref 0.93–1.7)
TRIGL SERPL-MCNC: 155 MG/DL (ref 0–150)
TSH SERPL DL<=0.005 MIU/L-ACNC: 1.7 UIU/ML (ref 0.27–4.2)
UROBILINOGEN UR STRIP-MCNC: NORMAL MG/DL
VLDLC SERPL CALC-MCNC: 27 MG/DL (ref 5–40)
WBC # BLD AUTO: 5.73 10*3/MM3 (ref 3.4–10.8)

## 2024-05-09 DIAGNOSIS — R97.20 INCREASED PROSTATE SPECIFIC ANTIGEN (PSA) VELOCITY: Primary | ICD-10-CM

## 2024-05-13 ENCOUNTER — PREP FOR SURGERY (OUTPATIENT)
Dept: OTHER | Facility: HOSPITAL | Age: 69
End: 2024-05-13
Payer: MEDICARE

## 2024-05-13 DIAGNOSIS — Z12.11 SCREEN FOR COLON CANCER: Primary | ICD-10-CM

## 2024-06-21 ENCOUNTER — TELEPHONE (OUTPATIENT)
Dept: INTERNAL MEDICINE | Age: 69
End: 2024-06-21
Payer: MEDICARE

## 2024-10-08 PROBLEM — Z12.11 SCREEN FOR COLON CANCER: Status: ACTIVE | Noted: 2024-05-13

## 2025-05-12 ENCOUNTER — OFFICE VISIT (OUTPATIENT)
Dept: INTERNAL MEDICINE | Age: 70
End: 2025-05-12
Payer: MEDICARE

## 2025-05-12 VITALS
HEIGHT: 70 IN | HEART RATE: 69 BPM | DIASTOLIC BLOOD PRESSURE: 82 MMHG | SYSTOLIC BLOOD PRESSURE: 132 MMHG | OXYGEN SATURATION: 96 % | RESPIRATION RATE: 16 BRPM | WEIGHT: 204 LBS | BODY MASS INDEX: 29.2 KG/M2 | TEMPERATURE: 97.8 F

## 2025-05-12 DIAGNOSIS — Z12.11 COLON CANCER SCREENING: ICD-10-CM

## 2025-05-12 DIAGNOSIS — R73.03 PREDIABETES: ICD-10-CM

## 2025-05-12 DIAGNOSIS — E78.5 HYPERLIPIDEMIA, UNSPECIFIED HYPERLIPIDEMIA TYPE: ICD-10-CM

## 2025-05-12 DIAGNOSIS — Z12.5 ENCOUNTER FOR SCREENING FOR MALIGNANT NEOPLASM OF PROSTATE: ICD-10-CM

## 2025-05-12 DIAGNOSIS — R97.20 INCREASED PROSTATE SPECIFIC ANTIGEN (PSA) VELOCITY: ICD-10-CM

## 2025-05-12 DIAGNOSIS — I10 PRIMARY HYPERTENSION: ICD-10-CM

## 2025-05-12 DIAGNOSIS — Z00.00 ENCOUNTER FOR ANNUAL WELLNESS VISIT (AWV) IN MEDICARE PATIENT: Primary | ICD-10-CM

## 2025-05-12 NOTE — PROGRESS NOTES
I N T E R N A L  M E D I C I MARU E    YARELY Luo      ENCOUNTER DATE:  05/12/2025    Leobardo Cunningham / 69 y.o. / male      MEDICARE ANNUAL WELLNESS VISIT       Chief Complaint:    Chief Complaint   Patient presents with    Medicare Wellness-subsequent         Patient's general assessment of his health since a year ago:     - Compared to one year ago, he feels his physical health is:   STABLE/SAME    - Compared to one year ago, he feels his mental health is:  STABLE/SAME    Recent Hospitalization (within past 365 days) (NO unless indicated)  No      Patient Care Team:    Patient Care Team:  Heidi Davis APRN as PCP - General (Family Medicine)  Gary Lr MD as Consulting Physician (Cardiology)    Allergies:  Patient has no known allergies.    Medications:  Current Outpatient Medications on File Prior to Visit   Medication Sig Dispense Refill    aspirin 81 MG EC tablet Take 1 tablet by mouth.      atorvastatin (LIPITOR) 80 MG tablet Take 1 tablet by mouth once daily 90 tablet 1    Coenzyme Q10 (CoQ10) 100 MG capsule Take  by mouth.      metoprolol succinate XL (Toprol XL) 50 MG 24 hr tablet Take 1 tablet by mouth Daily. 90 tablet 3    VITAMIN D PO Take  by mouth.      [DISCONTINUED] Zoster Vac Recomb Adjuvanted (Shingrix) 50 MCG/0.5ML reconstituted suspension Inject  into the appropriate muscle as directed by prescriber. (Patient not taking: Reported on 5/12/2025) 1 each 0     No current facility-administered medications on file prior to visit.          No opioid medication identified on active medication list. I have reviewed chart for other potential  high risk medication/s and harmful drug interactions in the elderly.       No opioid listed on medication list       HPI for other active medical problems:     Last seen May 2024.    HTN/ History of NSTEMI with stent placement: Followed by cardiology, Dr. Lr.  Today's BP is well controlled on metoprolol succinate XL 50 mg daily.   Remains on aspirin 81 mg daily without signs/ symptoms of bleeding.  He canceled cardiology appointment in January 2025 - plans to reschedule.     HLD: Remains on atorvastatin 80 mg daily.  May 2025 lipid panel with mildly elevated LDL 89, elevated triglycerides 155.  Avoiding red meat.       Prediabetes: May 2024 A1C 6.3.  Stays active with grandkids - walking granddaughter in [facility].       He has not yet scheduled colonoscopy screening, but is agreeable for new referral.     Previously referred to [facility] for changes of PSA velocity - he did not follow up.  Denies any urinary changes, groin pain.  He is not sure but possibly dad with prostate cancer.      HISTORY     PFSH:     The following portions of the patient's history were reviewed and updated as appropriate: Allergies / Current Medications / Past Medical History / Surgical History / Social History / Family History    Problem List:  Patient Active Problem List   Diagnosis    Hyperlipidemia    Hypertension    NSTEMI (non-ST elevated myocardial infarction)    Seborrheic dermatitis    Chest pain    Coronary artery disease involving native coronary artery of native heart without angina pectoris    S/P drug eluting coronary stent placement    Prediabetes    PVC (premature ventricular contraction)    Screen for colon cancer       Past Medical History:  Past Medical History:   Diagnosis Date    Myocardial infarction June 2020    Heart episode stint placed       Past Surgical History:  Past Surgical History:   Procedure Laterality Date    COLONOSCOPY  2005    I think    CORONARY ARTERY BYPASS GRAFT  June 2020       Social History:  Social History     Socioeconomic History    Marital status:    Tobacco Use    Smoking status: Never    Smokeless tobacco: Never   Vaping Use    Vaping status: Never Used   Substance and Sexual Activity    Alcohol use: Yes     Alcohol/week: 2.0 standard drinks of alcohol     Types: 1 Glasses of wine, 1 Cans of beer per week  "   Drug use: Never    Sexual activity: Yes     Partners: Female     Birth control/protection: None       Family History:  Family History   Problem Relation Age of Onset    Multiple myeloma Mother     Prostate cancer Father     Heart attack Brother     Coronary artery disease Brother          PATIENT ASSESSMENT     Vitals:  Vitals:    05/12/25 0854   BP: 132/82   BP Location: Left arm   Patient Position: Sitting   Cuff Size: Adult   Pulse: 69   Resp: 16   Temp: 97.8 °F (36.6 °C)   SpO2: 96%   Weight: 92.5 kg (204 lb)   Height: 177.8 cm (70\")       BP Readings from Last 3 Encounters:   05/12/25 132/82   05/06/24 144/86   01/10/24 124/78     Wt Readings from Last 3 Encounters:   05/12/25 92.5 kg (204 lb)   05/06/24 92.1 kg (203 lb)   01/10/24 90.3 kg (199 lb)      Body mass index is 29.27 kg/m².    [unfilled]        Review of Systems:    Review of Systems   Constitutional:  Negative for chills, fever and unexpected weight change.   Respiratory:  Negative for cough, chest tightness and shortness of breath.    Cardiovascular:  Negative for chest pain, palpitations and leg swelling.   Neurological:  Negative for dizziness, weakness, light-headedness and headaches.   Psychiatric/Behavioral:  The patient is not nervous/anxious.          Physical Exam:    Physical Exam  Vitals reviewed.   Constitutional:       General: He is not in acute distress.     Appearance: Normal appearance. He is not ill-appearing, toxic-appearing or diaphoretic.   HENT:      Head: Normocephalic and atraumatic.      Right Ear: Tympanic membrane, ear canal and external ear normal. There is no impacted cerumen.      Left Ear: Tympanic membrane, ear canal and external ear normal. There is no impacted cerumen.      Nose: Nose normal. No congestion or rhinorrhea.      Mouth/Throat:      Mouth: Mucous membranes are moist.      Pharynx: Oropharynx is clear. No oropharyngeal exudate or posterior oropharyngeal erythema.   Eyes:      Extraocular Movements: " Extraocular movements intact.      Conjunctiva/sclera: Conjunctivae normal.      Pupils: Pupils are equal, round, and reactive to light.   Cardiovascular:      Rate and Rhythm: Normal rate and regular rhythm.      Heart sounds: Normal heart sounds.   Pulmonary:      Effort: Pulmonary effort is normal. No respiratory distress.      Breath sounds: Normal breath sounds.   Abdominal:      General: Bowel sounds are normal.      Palpations: Abdomen is soft.      Tenderness: There is no abdominal tenderness.   Musculoskeletal:         General: Normal range of motion.      Cervical back: Normal range of motion and neck supple.      Right lower leg: No edema.      Left lower leg: No edema.   Lymphadenopathy:      Cervical: No cervical adenopathy.   Skin:     General: Skin is warm and dry.   Neurological:      General: No focal deficit present.      Mental Status: He is alert and oriented to person, place, and time. Mental status is at baseline.   Psychiatric:         Mood and Affect: Mood normal.         Behavior: Behavior normal.         Thought Content: Thought content normal.         Judgment: Judgment normal.           Reviewed Data:    Labs:   Lab Results   Component Value Date     05/07/2024    K 4.4 05/07/2024    CALCIUM 9.2 05/07/2024    AST 20 05/07/2024    ALT 22 05/07/2024    BUN 17 05/07/2024    CREATININE 1.04 05/07/2024    CREATININE 1.08 10/11/2023    CREATININE 1.07 04/10/2023    EGFRIFNONA >60 06/05/2020    EGFRIFAFRI >60 06/05/2020       Lab Results   Component Value Date     (H) 06/05/2020     (H) 06/04/2020    HGBA1C 6.30 (H) 05/07/2024    HGBA1C 6.30 (H) 10/11/2023    HGBA1C 5.90 (H) 04/10/2023       Lab Results   Component Value Date    LDL 89 05/07/2024     (H) 10/11/2023    LDL 88 04/10/2023    HDL 35 (L) 05/07/2024    TRIG 155 (H) 05/07/2024    CHOLHDLRATIO 4.31 05/07/2024       Lab Results   Component Value Date    TSH 1.700 05/07/2024    FREET4 0.89 (L) 05/07/2024           Lab Results   Component Value Date    WBC 5.73 05/07/2024    HGB 14.9 05/07/2024    HGB 15.5 01/05/2023     05/07/2024                   Lab Results   Component Value Date    PSA 1.250 05/07/2024    PSA 0.704 04/10/2023       Imaging:                Medical Tests:              Summary of old records / correspondence / consultant report:             Request outside records:           SCREENING ASSESSMENT      Screening for Glaucoma:  Previous screening for glaucoma?: No, plans to schedule    Hearing Loss Screen:  Finger Rub Hearing Test (right ear): Passed  Finger Rub Hearing Test (left ear): Passed    Urinary Incontinence Screen:  Episodes of urinary incontinence? : No    Depression Screen:      5/12/2025     8:58 AM   PHQ-2/PHQ-9 Depression Screening   Little interest or pleasure in doing things Not at all   Feeling down, depressed, or hopeless Not at all   How difficult have these problems made it for you to do your work, take care of things at home, or get along with other people? Not difficult at all        PHQ-2: 0 (Not depressed)    PHQ-9: 0 (Negative screening for depression)         FUNCTIONAL, FALL RISK, & COGNITIVE SCREENING (components below):     A) Functional and cognitive status based on patient responses:        5/12/2025     8:59 AM   Functional & Cognitive Status   Do you have difficulty preparing food and eating? No   Do you have difficulty bathing yourself, getting dressed or grooming yourself? No   Do you have difficulty using the toilet? No   Do you have difficulty moving around from place to place? No   Do you have trouble with steps or getting out of a bed or a chair? No   Current Diet Well Balanced Diet   Dental Exam Up to date   Eye Exam Not up to date   Exercise (times per week) 7 times per week   Current Exercises Include Walking   Do you need help using the phone?  No   Are you deaf or do you have serious difficulty hearing?  No   Do you need help to go to places out of  walking distance? No   Do you need help shopping? No   Do you need help preparing meals?  No   Do you need help with housework?  No   Do you need help with laundry? No   Do you need help taking your medications? No   Do you need help managing money? No   Do you ever drive or ride in a car without wearing a seat belt? No   Have you felt unusual stress, anger or loneliness in the last month? No   Who do you live with? Spouse   If you need help, do you have trouble finding someone available to you? No   Have you been bothered in the last four weeks by sexual problems? No   Do you have difficulty concentrating, remembering or making decisions? No       B) Assessment of Fall Risk:    Fall Risk Assessment was completed, and patient is at low risk for falls.    Need for further evaluation of gait, strength, and balance? : NO    Timed Up and Go (TUG): 8 seconds   (>= 12 seconds indicates high risk for falling)    Observable abnormalities included:   Normal balance and gait pattern    C. Assessment of Cognitive Function:    Mini-Cog Test:     1) Registration (3 objects): YES   2) Clock Draw: Passed? : Yes   3) Number of objects recalled: 3 (MA)     Further evaluation required? : No    **OVERALL ASSESSMENT OF FUNCTIONAL ABILITY**  (Assessment of ability to perform ADL's (showering/bathing, using toilet, dressing, feeding self, moving self around) and IADL's (use telephone, shop, prepare food, housekeep, do laundry, transport independently, take medications independently, and handle finances)    DEGREE OF FUNCTIONAL IMPAIRMENT:   NONE (based on assessment noted above)    ABILITY TO LIVE INDEPENDENTLY:    Capable of living with spouse/partner/family       COUNSELING       A. Identification of Health Risk Factors:    Risk factors include: cardiovascular risk factors      B. Age-Appropriate Screening Schedule:  (Refer to the list below for future screening recommendations based on patient's age, sex and/or medical conditions.  Orders for these recommended tests are listed in the plan section. The patient has been provided with a written plan)    Health Maintenance Topics  Health Maintenance   Topic Date Due    COLORECTAL CANCER SCREENING  Never done    LIPID PANEL  05/07/2025    Pneumococcal Vaccine 50+ (1 of 1 - PCV) 11/08/2025 (Originally 8/12/2005)    COVID-19 Vaccine (9 - 2024-25 season) 11/12/2025 (Originally 3/25/2025)    INFLUENZA VACCINE  07/01/2025    ANNUAL WELLNESS VISIT  05/12/2026    TDAP/TD VACCINES (3 - Td or Tdap) 10/31/2030    HEPATITIS C SCREENING  Completed    ZOSTER VACCINE  Completed       Health Maintenance Topics Due or Over-Due  Health Maintenance Due   Topic Date Due    COLORECTAL CANCER SCREENING  Never done    LIPID PANEL  05/07/2025         C. Advanced Care Planning:    Advance Care Planning   ACP discussion was held with the patient during this visit. Patient has an advance directive (not in EMR), copy requested.       D. Patient Self-Management and Personalized Health Advice:    He has been provided with PERSONALIZED COUNSELING/INFORMATION (AVS educational information) about:     -- weight management  reducing risk for cardiac/vascular events with pre-existing disease      He has been recommended for the following PREVENTATIVE SERVICES which has been performed today, will be ordered today or ordered/performed on upcoming follow-up visit:     LIFESTYLE PREVENTATIVE MEASURES   EYE exam for GLAUCOMA screening recommended annually (or follow-up regularly with eye care specialist for active glaucoma history)    CARDIOVASCULAR SCREENING   Has established history of CV disease    CANCER SCREENING   COLORECTAL cancer: Colonoscopy/Cologuard discussed   PROSTATE CANCER: (discussed and PSA will be performed annually)    MISC SCREENING  DIABETES screening performed (current/reviewed labs/lab ordered)    VACCINATION/IMMUNIZATION   COVID-19 vaccination discussed/recommended  STREP PNEUMO vaccine  administered/recommended/discussed (65+)      E. Miscellaneous Items: [id number]    Aspirin use counseling: Taking ASA appropriately as indicated    Discussed BMI with him. The BMI is above average; BMI management plan is completed (discussed plans for weight loss)    Reviewed use of high risk medication in the elderly: YES    Reviewed for potential of harmful drug interactions in the elderly: YES        WRAP UP       Assessment & Plan:    1) MEDICARE ANNUAL WELLNESS VISIT    2) OTHER MEDICAL CONDITIONS ADDRESSED TODAY:            Problem List Items Addressed This Visit       Hyperlipidemia    Relevant Medications    atorvastatin (LIPITOR) 80 MG tablet    Hypertension    Overview   Last Assessment & Plan:   Formatting of this note might be different from the original.  On Toprol XL 50 milligrams daily.  Will continue.         Relevant Medications    metoprolol succinate XL (Toprol XL) 50 MG 24 hr tablet    Prediabetes     Other Visit Diagnoses         Encounter for annual wellness visit (AWV) in Medicare patient    -  Primary      Increased prostate specific antigen (PSA) velocity                        No orders of the defined types were placed in this encounter.      Discussion / Summary:    Ensure low sodium, low fat and low carb diet.  Recommend regular exercise as tolerated.    Encouraged to re schedule missed appointment with cardiology office.    Schedule colonoscopy.    He believes he already received Prevnar-20 vaccine.  He will check with pharmacy to confirm.    Monitor PSA with updated lab.    Medications as of TODAY:              Current Outpatient Medications   Medication Sig Dispense Refill    aspirin 81 MG EC tablet Take 1 tablet by mouth.      atorvastatin (LIPITOR) 80 MG tablet Take 1 tablet by mouth once daily 90 tablet 1    Coenzyme Q10 (CoQ10) 100 MG capsule Take  by mouth.      metoprolol succinate XL (Toprol XL) 50 MG 24 hr tablet Take 1 tablet by mouth Daily. 90 tablet 3    VITAMIN D PO Take   by mouth.       No current facility-administered medications for this visit.         FOLLOW-UP:            No follow-ups on file.              Future Appointments   Date Time Provider Department Center   5/12/2025 10:00 AM Heidi Davis APRN [facility]  [facility]           After Visit Summary (AVS) including the Personalized Prevention  Plan Services (PPPS) was either printed and given to the patient at check-out today and/or sent to [facility] for review.

## 2025-05-14 ENCOUNTER — LAB (OUTPATIENT)
Facility: HOSPITAL | Age: 70
End: 2025-05-14
Payer: MEDICARE

## 2025-05-14 LAB
ALBUMIN SERPL-MCNC: 4.4 G/DL (ref 3.5–5.2)
ALBUMIN/GLOB SERPL: 1.5 G/DL
ALP SERPL-CCNC: 103 U/L (ref 39–117)
ALT SERPL W P-5'-P-CCNC: 29 U/L (ref 1–41)
ANION GAP SERPL CALCULATED.3IONS-SCNC: 10.9 MMOL/L (ref 5–15)
AST SERPL-CCNC: 22 U/L (ref 1–40)
BASOPHILS # BLD AUTO: 0.02 10*3/MM3 (ref 0–0.2)
BASOPHILS NFR BLD AUTO: 0.4 % (ref 0–1.5)
BILIRUB SERPL-MCNC: 0.6 MG/DL (ref 0–1.2)
BILIRUB UR QL STRIP: NEGATIVE
BUN SERPL-MCNC: 15 MG/DL (ref 8–23)
BUN/CREAT SERPL: 15.2 (ref 7–25)
CALCIUM SPEC-SCNC: 9 MG/DL (ref 8.6–10.5)
CHLORIDE SERPL-SCNC: 104 MMOL/L (ref 98–107)
CHOLEST SERPL-MCNC: 135 MG/DL (ref 0–200)
CLARITY UR: CLEAR
CO2 SERPL-SCNC: 25.1 MMOL/L (ref 22–29)
COLOR UR: ABNORMAL
CREAT SERPL-MCNC: 0.99 MG/DL (ref 0.76–1.27)
DEPRECATED RDW RBC AUTO: 39 FL (ref 37–54)
EGFRCR SERPLBLD CKD-EPI 2021: 82.5 ML/MIN/1.73
EOSINOPHIL # BLD AUTO: 0.12 10*3/MM3 (ref 0–0.4)
EOSINOPHIL NFR BLD AUTO: 2.4 % (ref 0.3–6.2)
ERYTHROCYTE [DISTWIDTH] IN BLOOD BY AUTOMATED COUNT: 11.9 % (ref 12.3–15.4)
GLOBULIN UR ELPH-MCNC: 2.9 GM/DL
GLUCOSE SERPL-MCNC: 135 MG/DL (ref 65–99)
GLUCOSE UR STRIP-MCNC: NEGATIVE MG/DL
HBA1C MFR BLD: 6.5 % (ref 4.8–5.6)
HCT VFR BLD AUTO: 44 % (ref 37.5–51)
HDLC SERPL QL: 4.35
HDLC SERPL-MCNC: 31 MG/DL (ref 40–60)
HGB BLD-MCNC: 15 G/DL (ref 13–17.7)
HGB UR QL STRIP.AUTO: NEGATIVE
IMM GRANULOCYTES # BLD AUTO: 0.05 10*3/MM3 (ref 0–0.05)
IMM GRANULOCYTES NFR BLD AUTO: 1 % (ref 0–0.5)
KETONES UR QL STRIP: NEGATIVE
LDLC SERPL CALC-MCNC: 76 MG/DL (ref 0–100)
LEUKOCYTE ESTERASE UR QL STRIP.AUTO: NEGATIVE
LYMPHOCYTES # BLD AUTO: 1.64 10*3/MM3 (ref 0.7–3.1)
LYMPHOCYTES NFR BLD AUTO: 32.3 % (ref 19.6–45.3)
MCH RBC QN AUTO: 30.8 PG (ref 26.6–33)
MCHC RBC AUTO-ENTMCNC: 34.1 G/DL (ref 31.5–35.7)
MCV RBC AUTO: 90.3 FL (ref 79–97)
MONOCYTES # BLD AUTO: 0.46 10*3/MM3 (ref 0.1–0.9)
MONOCYTES NFR BLD AUTO: 9.1 % (ref 5–12)
NEUTROPHILS NFR BLD AUTO: 2.79 10*3/MM3 (ref 1.7–7)
NEUTROPHILS NFR BLD AUTO: 54.8 % (ref 42.7–76)
NITRITE UR QL STRIP: NEGATIVE
NRBC BLD AUTO-RTO: 0 /100 WBC (ref 0–0.2)
PH UR STRIP.AUTO: 6 [PH] (ref 5–8)
PLATELET # BLD AUTO: 197 10*3/MM3 (ref 140–450)
PMV BLD AUTO: 9.6 FL (ref 6–12)
POTASSIUM SERPL-SCNC: 4.1 MMOL/L (ref 3.5–5.2)
PROT SERPL-MCNC: 7.3 G/DL (ref 6–8.5)
PROT UR QL STRIP: NEGATIVE
PSA SERPL-MCNC: 2.2 NG/ML (ref 0–4)
RBC # BLD AUTO: 4.87 10*6/MM3 (ref 4.14–5.8)
SODIUM SERPL-SCNC: 140 MMOL/L (ref 136–145)
SP GR UR STRIP: 1.02 (ref 1–1.03)
T4 FREE SERPL-MCNC: 0.97 NG/DL (ref 0.92–1.68)
TRIGL SERPL-MCNC: 163 MG/DL (ref 0–150)
TSH SERPL DL<=0.05 MIU/L-ACNC: 2.28 UIU/ML (ref 0.27–4.2)
UROBILINOGEN UR QL STRIP: ABNORMAL
VLDLC SERPL-MCNC: 28 MG/DL (ref 5–40)
WBC NRBC COR # BLD AUTO: 5.08 10*3/MM3 (ref 3.4–10.8)

## 2025-05-14 PROCEDURE — 84443 ASSAY THYROID STIM HORMONE: CPT

## 2025-05-14 PROCEDURE — 36415 COLL VENOUS BLD VENIPUNCTURE: CPT

## 2025-05-14 PROCEDURE — 84439 ASSAY OF FREE THYROXINE: CPT

## 2025-05-14 PROCEDURE — 83036 HEMOGLOBIN GLYCOSYLATED A1C: CPT

## 2025-05-14 PROCEDURE — G0103 PSA SCREENING: HCPCS

## 2025-05-14 PROCEDURE — 80061 LIPID PANEL: CPT

## 2025-05-14 PROCEDURE — 85025 COMPLETE CBC W/AUTO DIFF WBC: CPT

## 2025-05-14 PROCEDURE — 81003 URINALYSIS AUTO W/O SCOPE: CPT

## 2025-05-14 PROCEDURE — 80053 COMPREHEN METABOLIC PANEL: CPT

## 2025-05-27 NOTE — PROGRESS NOTES
"    I N T E R N A L  M E D I C I N E  Heidi Davis, APRN    ENCOUNTER DATE:  05/29/2025    Leobardo Cunningham / 69 y.o. / male      CHIEF COMPLAINT / REASON FOR OFFICE VISIT     Diabetes      ASSESSMENT & PLAN     Diagnoses and all orders for this visit:    1. Type 2 diabetes mellitus without complication, without long-term current use of insulin (Primary)  -     glucose monitor monitoring kit; Use to check FBS once daily.  Diabetes. E11.9  Dispense: 1 each; Refill: 0  -     Lancets (freestyle) lancets; Use to check FBS once daily.  Diabetes. E11.9  Dispense: 100 each; Refill: 1  -     glucose blood test strip; Use to check FBS once daily.  Diabetes. E11.9  Dispense: 100 each; Refill: 1  -     Microalbumin / Creatinine Urine Ratio - Urine, Clean Catch         SUMMARY/DISCUSSION  Patient educated on pathophysiology of type 2 diabetes diagnosis.  He is very family with type 2 diabetes - wife with diagnosis for approximately two decades.  He declines diabetes educator referral - has previously met with diabetes educator with his wife.    Discussed signs/ symptoms of hypoglycemia and hyperglycemia.  He will monitor fasting glucoses for goal of .    He is motivated to pursue diet and lifestyle improvements and declines medication at this time.    Encouraged to obtain diabetic eye exam.  Recheck A1C in 4 months.  Encouraged to schedule with [facility] for increased PSA velocity - he states he will schedule.      Next Appointment with me: 11/13/2025    Return for [facility] appointment to 4 months out from today.      VITAL SIGNS     Visit Vitals  /84   Pulse 89   Temp 97.8 °F (36.6 °C)   Resp 16   Ht 177.8 cm (70\")   Wt 92.1 kg (203 lb)   SpO2 95%   BMI 29.13 kg/m²             Wt Readings from Last 3 Encounters:   05/29/25 92.1 kg (203 lb)   05/12/25 92.5 kg (204 lb)   05/06/24 92.1 kg (203 lb)     Body mass index is 29.13 kg/m².        MEDICATIONS AT THE TIME OF OFFICE VISIT     Current Outpatient " Medications on File Prior to Visit   Medication Sig Dispense Refill    aspirin 81 MG EC tablet Take 1 tablet by mouth.      atorvastatin (LIPITOR) 80 MG tablet Take 1 tablet by mouth once daily 90 tablet 1    Coenzyme Q10 (CoQ10) 100 MG capsule Take  by mouth.      metoprolol succinate XL (Toprol XL) 50 MG 24 hr tablet Take 1 tablet by mouth Daily. 90 tablet 3    VITAMIN D PO Take  by mouth.       No current facility-administered medications on file prior to visit.        HISTORY OF PRESENT ILLNESS     Last seen May 12, 2025.  Now a new type 2 diabetic with May 2025 A1C of 6.5.  Sweets are a particular weakness, and he is working towards limiting intake.  Stays physically active watching his grandchildren.      He plans to schedule with [facility] office for monitoring of increased PSA velocity with possible family history of prostate cancer in his brother.        Patient Care Team:  Heidi Davis APRN as PCP - General (Family Medicine)  Gary Lr MD as Consulting Physician (Cardiology)    REVIEW OF SYSTEMS     Review of Systems   Constitutional:  Negative for chills, fatigue and fever.   HENT:  Negative for congestion and sore throat.    Respiratory:  Negative for cough.    Cardiovascular:  Negative for chest pain.   Gastrointestinal:  Negative for nausea and vomiting.   Endocrine: Negative for polydipsia, polyphagia and polyuria.   Genitourinary:  Negative for dysuria.   Musculoskeletal:  Negative for myalgias and neck pain.   Skin:  Negative for rash.   Neurological:  Negative for weakness, numbness and headaches.          PHYSICAL EXAMINATION     Physical Exam  Vitals reviewed.   Constitutional:       General: He is not in acute distress.     Appearance: Normal appearance. He is not ill-appearing, toxic-appearing or diaphoretic.   HENT:      Head: Normocephalic and atraumatic.   Cardiovascular:      Rate and Rhythm: Normal rate and regular rhythm.      Heart sounds: Normal heart sounds.    Pulmonary:      Effort: Pulmonary effort is normal.      Breath sounds: Normal breath sounds.   Musculoskeletal:      Right lower leg: No edema.      Left lower leg: No edema.   Neurological:      Mental Status: He is alert and oriented to person, place, and time. Mental status is at baseline.   Psychiatric:         Mood and Affect: Mood normal.         Behavior: Behavior normal.         Thought Content: Thought content normal.         Judgment: Judgment normal.           REVIEWED DATA     Labs:           Imaging:            Medical Tests:           Summary of old records / correspondence / consultant report:           Request outside records:

## 2025-05-29 ENCOUNTER — OFFICE VISIT (OUTPATIENT)
Dept: INTERNAL MEDICINE | Age: 70
End: 2025-05-29
Payer: MEDICARE

## 2025-05-29 VITALS
TEMPERATURE: 97.8 F | OXYGEN SATURATION: 95 % | WEIGHT: 203 LBS | HEIGHT: 70 IN | SYSTOLIC BLOOD PRESSURE: 116 MMHG | DIASTOLIC BLOOD PRESSURE: 84 MMHG | HEART RATE: 89 BPM | RESPIRATION RATE: 16 BRPM | BODY MASS INDEX: 29.06 KG/M2

## 2025-05-29 DIAGNOSIS — E11.9 TYPE 2 DIABETES MELLITUS WITHOUT COMPLICATION, WITHOUT LONG-TERM CURRENT USE OF INSULIN: Primary | ICD-10-CM

## 2025-05-29 RX ORDER — LANCETS 28 GAUGE
EACH MISCELLANEOUS
Qty: 100 EACH | Refills: 1 | Status: SHIPPED | OUTPATIENT
Start: 2025-05-29

## 2025-05-29 RX ORDER — BLOOD-GLUCOSE METER
KIT MISCELLANEOUS
Qty: 1 EACH | Refills: 0 | Status: SHIPPED | OUTPATIENT
Start: 2025-05-29

## 2025-05-30 ENCOUNTER — PRIOR AUTHORIZATION (OUTPATIENT)
Dept: INTERNAL MEDICINE | Age: 70
End: 2025-05-30
Payer: MEDICARE

## 2025-05-30 LAB
ALBUMIN/CREAT UR: 4 MG/G CREAT (ref 0–29)
CREAT UR-MCNC: 166.8 MG/DL
MICROALBUMIN UR-MCNC: 6.4 UG/ML

## 2025-05-30 NOTE — TELEPHONE ENCOUNTER
FreeStyle Lancets      [employer]          Additional Information Required  The member recently filled this medication and will be able to return for their next refill according to their plan limits. If dosage has changed, contact the pharmacy to submit the appropriate submission clarification codes (SCC).

## 2025-06-22 DIAGNOSIS — E11.9 TYPE 2 DIABETES MELLITUS WITHOUT COMPLICATION, WITHOUT LONG-TERM CURRENT USE OF INSULIN: ICD-10-CM

## 2025-06-23 RX ORDER — BLOOD-GLUCOSE METER
EACH MISCELLANEOUS
Qty: 1 KIT | Refills: 0 | Status: SHIPPED | OUTPATIENT
Start: 2025-06-23

## 2025-07-15 DIAGNOSIS — E78.5 HYPERLIPIDEMIA, UNSPECIFIED HYPERLIPIDEMIA TYPE: ICD-10-CM

## 2025-07-15 DIAGNOSIS — E11.9 TYPE 2 DIABETES MELLITUS WITHOUT COMPLICATION, WITHOUT LONG-TERM CURRENT USE OF INSULIN: ICD-10-CM

## 2025-07-15 DIAGNOSIS — I10 HYPERTENSION, UNSPECIFIED TYPE: ICD-10-CM

## 2025-07-15 RX ORDER — METOPROLOL SUCCINATE 50 MG/1
50 TABLET, EXTENDED RELEASE ORAL DAILY
Qty: 90 TABLET | Refills: 0 | Status: SHIPPED | OUTPATIENT
Start: 2025-07-15

## 2025-07-15 RX ORDER — VITAMIN B COMPLEX
100 TABLET ORAL DAILY
Qty: 30 EACH | Refills: 0 | Status: SHIPPED | OUTPATIENT
Start: 2025-07-15

## 2025-07-15 RX ORDER — ATORVASTATIN CALCIUM 80 MG/1
80 TABLET, FILM COATED ORAL DAILY
Qty: 90 TABLET | Refills: 0 | Status: SHIPPED | OUTPATIENT
Start: 2025-07-15

## 2025-07-15 NOTE — TELEPHONE ENCOUNTER
When you get a chance, he is overdue for a 1 year follow up with Dr. Lr. He saw Mariama last so he should probably see Dr. Lr. But that being said I will refill his meds until his appt once it is on the books. Or if he would like to be seen sooner, he can see an APRN so that he doesn't have to wait 6 months potentially.    Thanks,  Tasneem